# Patient Record
Sex: MALE | Race: WHITE | NOT HISPANIC OR LATINO | Employment: FULL TIME | URBAN - METROPOLITAN AREA
[De-identification: names, ages, dates, MRNs, and addresses within clinical notes are randomized per-mention and may not be internally consistent; named-entity substitution may affect disease eponyms.]

---

## 2017-05-11 ENCOUNTER — LAB VISIT (OUTPATIENT)
Dept: LAB | Facility: HOSPITAL | Age: 47
End: 2017-05-11
Attending: INTERNAL MEDICINE
Payer: COMMERCIAL

## 2017-05-11 ENCOUNTER — OFFICE VISIT (OUTPATIENT)
Dept: INTERNAL MEDICINE | Facility: CLINIC | Age: 47
End: 2017-05-11
Payer: COMMERCIAL

## 2017-05-11 VITALS
SYSTOLIC BLOOD PRESSURE: 122 MMHG | DIASTOLIC BLOOD PRESSURE: 74 MMHG | TEMPERATURE: 98 F | HEART RATE: 58 BPM | BODY MASS INDEX: 26.74 KG/M2 | HEIGHT: 69 IN | WEIGHT: 180.56 LBS

## 2017-05-11 DIAGNOSIS — Z00.00 ROUTINE HISTORY AND PHYSICAL EXAMINATION OF ADULT: ICD-10-CM

## 2017-05-11 DIAGNOSIS — Z72.51 HIGH RISK SEXUAL BEHAVIOR: ICD-10-CM

## 2017-05-11 DIAGNOSIS — R53.83 FATIGUE, UNSPECIFIED TYPE: ICD-10-CM

## 2017-05-11 DIAGNOSIS — Z00.00 ROUTINE HISTORY AND PHYSICAL EXAMINATION OF ADULT: Primary | ICD-10-CM

## 2017-05-11 DIAGNOSIS — F42.9 OBSESSIVE-COMPULSIVE DISORDER, UNSPECIFIED TYPE: ICD-10-CM

## 2017-05-11 PROBLEM — F54 PSYCHOGENIC POLYDIPSIA: Status: ACTIVE | Noted: 2017-05-11

## 2017-05-11 PROBLEM — R63.1 PSYCHOGENIC POLYDIPSIA: Status: ACTIVE | Noted: 2017-05-11

## 2017-05-11 LAB
ALBUMIN SERPL BCP-MCNC: 3.8 G/DL
ALP SERPL-CCNC: 75 U/L
ALT SERPL W/O P-5'-P-CCNC: 25 U/L
ANION GAP SERPL CALC-SCNC: 7 MMOL/L
AST SERPL-CCNC: 33 U/L
BILIRUB SERPL-MCNC: 0.6 MG/DL
BUN SERPL-MCNC: 19 MG/DL
CALCIUM SERPL-MCNC: 9.6 MG/DL
CHLORIDE SERPL-SCNC: 102 MMOL/L
CHOLEST/HDLC SERPL: 3.1 {RATIO}
CO2 SERPL-SCNC: 28 MMOL/L
CREAT SERPL-MCNC: 0.9 MG/DL
EST. GFR  (AFRICAN AMERICAN): >60 ML/MIN/1.73 M^2
EST. GFR  (NON AFRICAN AMERICAN): >60 ML/MIN/1.73 M^2
GLUCOSE SERPL-MCNC: 102 MG/DL
HDL/CHOLESTEROL RATIO: 32.1 %
HDLC SERPL-MCNC: 190 MG/DL
HDLC SERPL-MCNC: 61 MG/DL
HIV 1+2 AB+HIV1 P24 AG SERPL QL IA: NEGATIVE
LDLC SERPL CALC-MCNC: 111 MG/DL
NONHDLC SERPL-MCNC: 129 MG/DL
POTASSIUM SERPL-SCNC: 4.2 MMOL/L
PROT SERPL-MCNC: 7.6 G/DL
SODIUM SERPL-SCNC: 137 MMOL/L
TESTOST SERPL-MCNC: 429 NG/DL
TRIGL SERPL-MCNC: 90 MG/DL

## 2017-05-11 PROCEDURE — 84403 ASSAY OF TOTAL TESTOSTERONE: CPT

## 2017-05-11 PROCEDURE — 36415 COLL VENOUS BLD VENIPUNCTURE: CPT

## 2017-05-11 PROCEDURE — 87591 N.GONORRHOEAE DNA AMP PROB: CPT | Mod: 91

## 2017-05-11 PROCEDURE — 86703 HIV-1/HIV-2 1 RESULT ANTBDY: CPT

## 2017-05-11 PROCEDURE — 99396 PREV VISIT EST AGE 40-64: CPT | Mod: S$GLB,,, | Performed by: INTERNAL MEDICINE

## 2017-05-11 PROCEDURE — 86592 SYPHILIS TEST NON-TREP QUAL: CPT

## 2017-05-11 PROCEDURE — 88112 CYTOPATH CELL ENHANCE TECH: CPT | Mod: 26,,, | Performed by: PATHOLOGY

## 2017-05-11 PROCEDURE — 80053 COMPREHEN METABOLIC PANEL: CPT

## 2017-05-11 PROCEDURE — 87591 N.GONORRHOEAE DNA AMP PROB: CPT

## 2017-05-11 PROCEDURE — 99999 PR PBB SHADOW E&M-EST. PATIENT-LVL IV: CPT | Mod: PBBFAC,,, | Performed by: INTERNAL MEDICINE

## 2017-05-11 PROCEDURE — 83036 HEMOGLOBIN GLYCOSYLATED A1C: CPT

## 2017-05-11 PROCEDURE — 87491 CHLMYD TRACH DNA AMP PROBE: CPT

## 2017-05-11 PROCEDURE — 80061 LIPID PANEL: CPT

## 2017-05-11 PROCEDURE — 88112 CYTOPATH CELL ENHANCE TECH: CPT | Performed by: PATHOLOGY

## 2017-05-11 RX ORDER — CITALOPRAM 10 MG/1
10 TABLET ORAL DAILY
Qty: 30 TABLET | Refills: 11
Start: 2017-05-11 | End: 2017-10-24

## 2017-05-11 NOTE — PROGRESS NOTES
Subjective:       Patient ID: Robby Maya is a 46 y.o. male.    Chief Complaint: Annual Exam    HPI - Mr. Maya is here for f/u.  He's taking his benzo, but didn't think citalopram helped so he stopped it.  He has OCD with anxiety, manifest by stereotyped behaviors, excessive water drinking, anger when interrupted, near constant worry, and insomnia.  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      He is sexually active with other men, with approximately 10 partners in the past six months.  Consistent condom use.  He wants STI screening and would like to consider PrEP.  He's fatigued and would like to have his testosterone levels checked.  Nonsmoker, rare alcohol use.    PMH:  Anxiety  Obsessive-Compulsive behaviors  Psychogenic Polydipsia     Meds: only klonopin now.    Review of Systems   Constitutional: Negative for activity change and unexpected weight change.   HENT: Negative for hearing loss, rhinorrhea and trouble swallowing.    Eyes: Negative for discharge.   Respiratory: Positive for chest tightness (when having anxiety). Negative for wheezing.    Cardiovascular: Positive for palpitations (longstanding). Negative for chest pain.   Gastrointestinal: Negative for blood in stool, constipation, diarrhea and vomiting.   Endocrine: Negative for polydipsia and polyuria.   Genitourinary: Negative for difficulty urinating, hematuria and urgency.   Musculoskeletal: Negative for arthralgias.   Neurological: Negative for weakness and headaches.   Psychiatric/Behavioral: Positive for dysphoric mood. Negative for confusion.       Objective:      Physical Exam   Constitutional:   Well-appearing anxious Prydeinig man in NAD   Nursing note and vitals reviewed.      Assessment:       1. Routine history and physical examination of adult    2. Obsessive-compulsive disorder, unspecified type    3. High risk sexual behavior    4. Fatigue, unspecified type        Plan:       Robby was seen today for  annual exam.    Diagnoses and all orders for this visit:    Routine history and physical examination of adult - healthy, other than OCD/anxiety disorder.  Update labs, as these haven't been done in a while  -     Comprehensive metabolic panel; Future  -     Lipid panel; Future  -     Hemoglobin A1c; Future    Obsessive-compulsive disorder, unspecified type - He has never seen psychology.  Needs to be on an SSRI, so I encouraged that also  -     Ambulatory consult to Psychology   -     citalopram (CELEXA) 10 MG tablet; Take 1 tablet (10 mg total) by mouth once daily.    High risk sexual behavior - at risk for HIV, stable.  When HIV comes back negative, will start on truvada  -     HIV-1 and HIV-2 antibodies; Future  -     RPR; Future  -     C. trachomatis/N. gonorrhoeae by AMP DNA Urine  -     Cancel: Cytology Specimen-Medical Cytology (Fluid/Wash/Brush)  -     Cytology Specimen-Medical Cytology (Fluid/Wash/Brush)  -     Misc. C trach/N gonor Amplified RNA Rectal  -     N.gonorroheae, Miscellaneous site, (AMP RNA) Throat; Future    Fatigue, unspecified type - new complaint.  Per patient request, will look at T levels.  -     Testosterone; Future    rtc 6 months, sooner prn.    G Jomar Velarde MD MPH  Staff Internist

## 2017-05-11 NOTE — MR AVS SNAPSHOT
[facility]  [address]  [place] [address]  Phone: [phone]  Fax: [phone]                  Robby Maya   2017 8:40 AM   Office Visit    Description:  Male : 1970   Provider:  Gabriel Velarde II, MD   Department:  [facility] Internal Medicine           Reason for Visit     Annual Exam           Diagnoses this Visit        Comments    Routine history and physical examination of adult    -  Primary     Obsessive-compulsive disorder, unspecified type         High risk sexual behavior         Fatigue, unspecified type                To Do List           Goals (5 Years of Data)     None      Follow-Up and Disposition     Return in about 6 months (around 2017) for Annual Exam.       These Medications        Disp Refills Start End    citalopram (CELEXA) 10 MG tablet 30 tablet 11 2017    Take 1 tablet (10 mg total) by mouth once daily. - Oral    Pharmacy: [facility] [address] [place] [address] [address] AT [facility] [place] [place] Davon Yanes  #: [phone]         [facility]     [facility] Nurse Care Line -  Assistance  Unless otherwise directed by your provider, please contact [facility], our nurse care line that is available for  assistance.     Registered nurses in the [facility] provide: appointment scheduling, clinical advisement, health education, and other advisory services.  Call: [phone] (toll free)               Medications           Message regarding Medications     Verify the changes and/or additions to your medication regime listed below are the same as discussed with your clinician today.  If any of these changes or additions are incorrect, please notify your healthcare provider.        START taking these NEW medications        Refills    citalopram (CELEXA) 10 MG tablet 11    Sig: Take 1 tablet (10 mg total) by mouth once daily.    Class: No Print    Route: Oral  "          Verify that the below list of medications is an accurate representation of the medications you are currently taking.  If none reported, the list may be blank. If incorrect, please contact your healthcare provider. Carry this list with you in case of emergency.           Current Medications     clonazePAM (KLONOPIN) 1 MG tablet TAKE 1 TABLET(1 MG) BY MOUTH TWICE DAILY AS NEEDED FOR ANXIETY    citalopram (CELEXA) 10 MG tablet Take 1 tablet (10 mg total) by mouth once daily.           Clinical Reference Information           Your Vitals Were     BP Pulse Temp Height Weight BMI    122/74 (BP Location: Right arm, Patient Position: Sitting, BP Method: Manual) 58 97.6 °F (36.4 °C) (Oral) 5' 8.5" (1.74 m) 81.9 kg (180 lb 8.9 oz) 27.05 kg/m2      Blood Pressure          Most Recent Value    BP  122/74      Allergies as of 5/11/2017     No Known Allergies      Immunizations Administered on Date of Encounter - 5/11/2017     None      Orders Placed During Today's Visit      Normal Orders This Visit    Ambulatory consult to Psychology     C. trachomatis/N. gonorrhoeae by AMP DNA Urine     Cytology Specimen-Medical Cytology (Fluid/Wash/Brush)     Misc. C trach/N gonor Amplified RNA Rectal     Future Labs/Procedures Expected by Expires    Comprehensive metabolic panel  5/11/2017 7/10/2018    Hemoglobin A1c  5/11/2017 8/9/2017    HIV-1 and HIV-2 antibodies  5/11/2017 7/10/2018    Lipid panel  5/11/2017 7/10/2018    N.gonorroheae, Miscellaneous site, (AMP RNA) Throat  5/11/2017 7/10/2018    RPR  5/11/2017 7/10/2018    Testosterone  5/11/2017 5/11/2018      Language Assistance Services     ATTENTION: Language assistance services are available, free of charge. Please call [phone].      ATENCIÓN: Si habla español, tiene a chapman disposición servicios gratuitos de asistencia lingüística. Llame al [phone].     CHÚ Ý: N?u b?n nói Ti?ng Vi?t, có các d?ch v? h? tr? ngôn ng? mi?n phí dành cho b?n. G?i s? [phone].   "       [facility] complies with applicable Federal civil rights laws and does not discriminate on the basis of race, color, national origin, age, disability, or sex.

## 2017-05-12 ENCOUNTER — PATIENT MESSAGE (OUTPATIENT)
Dept: INTERNAL MEDICINE | Facility: CLINIC | Age: 47
End: 2017-05-12

## 2017-05-12 LAB
C TRACH DNA SPEC QL NAA+PROBE: NOT DETECTED
C.TRACH RNA SOURCE: NORMAL
C.TRACH,MISC. AMP RNA: NEGATIVE
ESTIMATED AVG GLUCOSE: 120 MG/DL
HBA1C MFR BLD HPLC: 5.8 %
N GONORRHOEA DNA SPEC QL NAA+PROBE: NOT DETECTED
N.GONORROHEAE, AMP RNA SOURCE: NORMAL
N.GONORROHEAE, AMP RNA SOURCE: NORMAL
N.GONORROHEAE, MISC. AMP RNA: NEGATIVE
N.GONORROHEAE, MISC. AMP RNA: NEGATIVE
RPR SER QL: NORMAL

## 2017-07-12 RX ORDER — CLONAZEPAM 1 MG/1
TABLET ORAL
Qty: 90 TABLET | Refills: 5 | Status: SHIPPED | OUTPATIENT
Start: 2017-07-12 | End: 2018-02-22 | Stop reason: SDUPTHER

## 2017-08-11 ENCOUNTER — NURSE TRIAGE (OUTPATIENT)
Dept: ADMINISTRATIVE | Facility: CLINIC | Age: 47
End: 2017-08-11

## 2017-08-11 NOTE — TELEPHONE ENCOUNTER
"    Reason for Disposition   Heart beating irregularly or very rapidly    Protocols used: ST CHEST PAIN-A-OH    Pt c/o left chest "comprasion or sensation" not pain. Pt states he feels like his heart is not beating regularly. Care advice given. Pt states he does not want to go to the ER. Advised message would be sent to PCP office for further care advise.   "

## 2017-08-11 NOTE — TELEPHONE ENCOUNTER
Spoke to patient at length.  He has debilitating anxiety that has caused multiple problems in the past, including psychogenic polydipsia and several ER visits for atypical chest pain.  He has again stopped his celexa and is again having atypical chest pain.    After our call today, he agreed to go back on the celexa

## 2017-10-24 DIAGNOSIS — R46.81 OBSESSIVE BEHAVIOR: ICD-10-CM

## 2017-10-24 DIAGNOSIS — F41.9 ANXIETY: ICD-10-CM

## 2017-10-24 RX ORDER — CITALOPRAM 10 MG/1
TABLET ORAL
Qty: 90 TABLET | Refills: 3 | Status: SHIPPED | OUTPATIENT
Start: 2017-10-24 | End: 2018-05-31

## 2017-11-14 ENCOUNTER — TELEPHONE (OUTPATIENT)
Dept: INTERNAL MEDICINE | Facility: CLINIC | Age: 47
End: 2017-11-14

## 2017-11-14 ENCOUNTER — PATIENT MESSAGE (OUTPATIENT)
Dept: INTERNAL MEDICINE | Facility: CLINIC | Age: 47
End: 2017-11-14

## 2017-11-14 NOTE — TELEPHONE ENCOUNTER
----- Message from Melodie Brock sent at 11/14/2017  2:54 PM CST -----  Contact: self/[phone]      Patient is returning a phone call.  Who left a message for the patient: Ida  Does patient know what this is regarding:  appointment  Comments: Patient stated that he is available after lunch for his appointment.thanks

## 2018-02-23 RX ORDER — CLONAZEPAM 1 MG/1
TABLET ORAL
Qty: 90 TABLET | Refills: 0 | Status: SHIPPED | OUTPATIENT
Start: 2018-02-23 | End: 2018-05-31 | Stop reason: SDUPTHER

## 2018-02-23 RX ORDER — CLONAZEPAM 1 MG/1
TABLET ORAL
Qty: 90 TABLET | Refills: 0 | OUTPATIENT
Start: 2018-02-23

## 2018-02-23 NOTE — TELEPHONE ENCOUNTER
----- Message from Corby Vargas sent at 2/23/2018  1:56 PM CST -----  Contact: Patient @ [phone]  Patient needs a refill on (clonazePAM (KLONOPIN) 1 MG tablet )     [id number] - [place], [place] - [address] AT [facility] [address]  [address]  [facility] [address]  Phone: [phone] Fax: [phone]

## 2018-05-17 ENCOUNTER — TELEPHONE (OUTPATIENT)
Dept: INTERNAL MEDICINE | Facility: CLINIC | Age: 48
End: 2018-05-17

## 2018-05-17 NOTE — TELEPHONE ENCOUNTER
----- Message from Suzi Adames sent at 5/17/2018  9:44 AM CDT -----  Contact: self  Doctor appointment and lab have been scheduled.  Please link lab orders to the lab appointment.  Date of doctor appointment:  05/31  Physical or EP:  physical  Date of lab appointment:  05/28  Comments:

## 2018-05-31 ENCOUNTER — OFFICE VISIT (OUTPATIENT)
Dept: INTERNAL MEDICINE | Facility: CLINIC | Age: 48
End: 2018-05-31
Payer: COMMERCIAL

## 2018-05-31 VITALS
DIASTOLIC BLOOD PRESSURE: 76 MMHG | BODY MASS INDEX: 25.73 KG/M2 | WEIGHT: 169.75 LBS | HEIGHT: 68 IN | SYSTOLIC BLOOD PRESSURE: 122 MMHG | HEART RATE: 59 BPM

## 2018-05-31 DIAGNOSIS — Z11.3 ROUTINE SCREENING FOR STI (SEXUALLY TRANSMITTED INFECTION): ICD-10-CM

## 2018-05-31 DIAGNOSIS — F42.9 OBSESSIVE-COMPULSIVE DISORDER, UNSPECIFIED TYPE: Primary | ICD-10-CM

## 2018-05-31 DIAGNOSIS — F54 PSYCHOGENIC POLYDIPSIA: ICD-10-CM

## 2018-05-31 DIAGNOSIS — F41.9 ANXIETY: ICD-10-CM

## 2018-05-31 DIAGNOSIS — R63.1 PSYCHOGENIC POLYDIPSIA: ICD-10-CM

## 2018-05-31 DIAGNOSIS — Z71.84 TRAVEL ADVICE ENCOUNTER: ICD-10-CM

## 2018-05-31 LAB
BILIRUB UR QL STRIP: NEGATIVE
C TRACH DNA SPEC QL NAA+PROBE: NOT DETECTED
CLARITY UR REFRACT.AUTO: CLEAR
COLOR UR AUTO: YELLOW
GLUCOSE UR QL STRIP: NEGATIVE
HGB UR QL STRIP: NEGATIVE
KETONES UR QL STRIP: NEGATIVE
LEUKOCYTE ESTERASE UR QL STRIP: NEGATIVE
MICROSCOPIC COMMENT: NORMAL
N GONORRHOEA DNA SPEC QL NAA+PROBE: NOT DETECTED
NITRITE UR QL STRIP: NEGATIVE
PH UR STRIP: 6 [PH] (ref 5–8)
PROT UR QL STRIP: NEGATIVE
RBC #/AREA URNS AUTO: 0 /HPF (ref 0–4)
SP GR UR STRIP: 1 (ref 1–1.03)
URN SPEC COLLECT METH UR: NORMAL
UROBILINOGEN UR STRIP-ACNC: NEGATIVE EU/DL
WBC #/AREA URNS AUTO: 0 /HPF (ref 0–5)

## 2018-05-31 PROCEDURE — 3008F BODY MASS INDEX DOCD: CPT | Mod: CPTII,S$GLB,, | Performed by: INTERNAL MEDICINE

## 2018-05-31 PROCEDURE — 99396 PREV VISIT EST AGE 40-64: CPT | Mod: S$GLB,,, | Performed by: INTERNAL MEDICINE

## 2018-05-31 PROCEDURE — 99999 PR PBB SHADOW E&M-EST. PATIENT-LVL III: CPT | Mod: PBBFAC,,, | Performed by: INTERNAL MEDICINE

## 2018-05-31 PROCEDURE — 87491 CHLMYD TRACH DNA AMP PROBE: CPT

## 2018-05-31 PROCEDURE — 81001 URINALYSIS AUTO W/SCOPE: CPT

## 2018-05-31 RX ORDER — DULOXETIN HYDROCHLORIDE 30 MG/1
30 CAPSULE, DELAYED RELEASE ORAL DAILY
Qty: 90 CAPSULE | Refills: 3 | Status: SHIPPED | OUTPATIENT
Start: 2018-05-31 | End: 2018-12-05

## 2018-05-31 RX ORDER — CLONAZEPAM 1 MG/1
1 TABLET ORAL 2 TIMES DAILY
Qty: 90 TABLET | Refills: 0 | Status: SHIPPED | OUTPATIENT
Start: 2018-05-31 | End: 2018-11-07 | Stop reason: SDUPTHER

## 2018-05-31 RX ORDER — ZOLPIDEM TARTRATE 5 MG/1
5 TABLET ORAL NIGHTLY PRN
Qty: 5 TABLET | Refills: 0 | Status: SHIPPED | OUTPATIENT
Start: 2018-05-31 | End: 2018-12-27

## 2018-05-31 NOTE — PROGRESS NOTES
Subjective:       Patient ID: Robby Maya is a 47 y.o. male.    Chief Complaint: Annual Exam    HPI - Robby is in a good mood today.  He did not like taking citalopram, so is only on klonopin.  OCD behaviors continue unabated, and he recognizes these as such.  Not drinking water like he used to; worried about recurrence of hyponatremia.  He says he's not very sexually active (two male partners in past six months), but requests sti screening.  He'll think about truvada for hiv prevention, but not now.  He's traveling to the [facility] soon and requested ambien to take with him to help with the jetlag.    PMH:  Anxiety  Obsessive-Compulsive behaviors  Psychogenic Polydipsia     Meds: only klonopin.    Review of Systems   Constitutional: Negative for activity change and unexpected weight change.   HENT: Negative for hearing loss, rhinorrhea and trouble swallowing.    Eyes: Negative for discharge and visual disturbance.   Respiratory: Negative for wheezing.    Cardiovascular: Positive for palpitations. Negative for chest pain.   Gastrointestinal: Negative for blood in stool, constipation, diarrhea and vomiting.   Endocrine: Negative for polydipsia and polyuria.   Genitourinary: Negative for difficulty urinating, hematuria and urgency.   Musculoskeletal: Negative for arthralgias, joint swelling and neck pain.   Skin: Negative for rash.   Neurological: Negative for weakness and headaches.   Psychiatric/Behavioral: Negative for confusion and dysphoric mood.       Objective:      Physical Exam   Constitutional: He is oriented to person, place, and time. He appears well-developed and well-nourished. No distress.   Well-appearing lean, muscular man who looks younger than age 47.   HENT:   Head: Normocephalic and atraumatic.   Cardiovascular: Normal rate, regular rhythm and normal heart sounds.  Exam reveals no gallop and no friction rub.    No murmur heard.  Pulmonary/Chest: No respiratory distress. He has no  wheezes. He has no rales. He exhibits no tenderness.   Neurological: He is alert and oriented to person, place, and time.   Skin: Skin is warm. He is not diaphoretic. No erythema.   Psychiatric:   Anxious affect.   Nursing note and vitals reviewed.      Assessment:       1. Obsessive-compulsive disorder, unspecified type    2. Anxiety    3. Psychogenic polydipsia    4. Routine screening for STI (sexually transmitted infection)    5. Travel advice encounter        Plan:       Robby was seen today for annual exam.    Diagnoses and all orders for this visit:    Obsessive-compulsive disorder, unspecified type - spent most of the visit discussing this.  Uncontrolled, untreated.  Encouraged adherence to medication regimen.  Start cymbalta  -     DULoxetine (CYMBALTA) 30 MG capsule; Take 1 capsule (30 mg total) by mouth once daily.    Anxiety  -     DULoxetine (CYMBALTA) 30 MG capsule; Take 1 capsule (30 mg total) by mouth once daily.    Psychogenic polydipsia  -     DULoxetine (CYMBALTA) 30 MG capsule; Take 1 capsule (30 mg total) by mouth once daily.  -     Comprehensive metabolic panel; Future  -     Urinalysis    Routine screening for STI (sexually transmitted infection) - per recommendations for MSM.  He had a high copay for rectal and oral gc/chlamydia  -     HIV-1 and HIV-2 antibodies; Future  -     RPR; Future  -     C. trachomatis/N. gonorrhoeae by AMP DNA Urine    Travel advice encounter - new complaint.  Recommended ambien; will give 5 days worth.  Do NOT anticipate refilling this.  -     zolpidem (AMBIEN) 5 MG Tab; Take 1 tablet (5 mg total) by mouth nightly as needed.    rtc 6 months to check on ocd progression.      JIHAN Velarde MD MPH  Staff Internist

## 2018-05-31 NOTE — TELEPHONE ENCOUNTER
----- Message from Graham Salinas sent at 5/31/2018  9:40 AM CDT -----  Rx Refill/Request     Is this a Refill or New Rx:  Refill  Rx Name and Strength:  clonazePAM (KLONOPIN) 1 MG tablet  Preferred Pharmacy with phone number: See below  Communication Preference: [phone]  Additional Information:     [facility] [address] [place], [place] [address] [address] AT [address]  [place] [address]  Phone: [phone] Fax: [phone]

## 2018-07-18 ENCOUNTER — TELEPHONE (OUTPATIENT)
Dept: INTERNAL MEDICINE | Facility: CLINIC | Age: 48
End: 2018-07-18

## 2018-07-18 NOTE — TELEPHONE ENCOUNTER
----- Message from Tasha Solis sent at 7/17/2018  3:33 PM CDT -----  Contact: Call  [phone].   Patient needed to verify that you coded his appointment on 5/31/2018 as an  annual physical? His insurance told him it was not coded as such and is charging him $500.00. I referred the patient to billing but he said he has spoken with billing stated it was not showing an annual exam.  Please call him.

## 2018-09-11 ENCOUNTER — OFFICE VISIT (OUTPATIENT)
Dept: URGENT CARE | Facility: CLINIC | Age: 48
End: 2018-09-11
Payer: COMMERCIAL

## 2018-09-11 VITALS
HEIGHT: 68 IN | OXYGEN SATURATION: 100 % | SYSTOLIC BLOOD PRESSURE: 121 MMHG | TEMPERATURE: 99 F | DIASTOLIC BLOOD PRESSURE: 79 MMHG | BODY MASS INDEX: 25.76 KG/M2 | WEIGHT: 170 LBS | HEART RATE: 61 BPM

## 2018-09-11 DIAGNOSIS — H92.03 OTALGIA OF BOTH EARS: ICD-10-CM

## 2018-09-11 DIAGNOSIS — Z20.2 POSSIBLE EXPOSURE TO STD: Primary | ICD-10-CM

## 2018-09-11 DIAGNOSIS — J01.40 ACUTE PANSINUSITIS, RECURRENCE NOT SPECIFIED: ICD-10-CM

## 2018-09-11 PROCEDURE — 99214 OFFICE O/P EST MOD 30 MIN: CPT | Mod: S$GLB,,, | Performed by: NURSE PRACTITIONER

## 2018-09-11 PROCEDURE — 3008F BODY MASS INDEX DOCD: CPT | Mod: CPTII,S$GLB,, | Performed by: NURSE PRACTITIONER

## 2018-09-11 RX ORDER — CEFDINIR 300 MG/1
300 CAPSULE ORAL 2 TIMES DAILY
Qty: 14 CAPSULE | Refills: 0 | Status: SHIPPED | OUTPATIENT
Start: 2018-09-11 | End: 2018-09-18

## 2018-09-11 RX ORDER — CEFDINIR 300 MG/1
300 CAPSULE ORAL 2 TIMES DAILY
Qty: 14 CAPSULE | Refills: 0 | Status: SHIPPED | OUTPATIENT
Start: 2018-09-11 | End: 2018-09-11

## 2018-09-11 RX ORDER — BETAMETHASONE SODIUM PHOSPHATE AND BETAMETHASONE ACETATE 3; 3 MG/ML; MG/ML
9 INJECTION, SUSPENSION INTRA-ARTICULAR; INTRALESIONAL; INTRAMUSCULAR; SOFT TISSUE
Status: DISCONTINUED | OUTPATIENT
Start: 2018-09-11 | End: 2019-06-06

## 2018-09-11 NOTE — PROGRESS NOTES
"Subjective:       Patient ID: Robby Maya is a 48 y.o. male.    Vitals:  height is 5' 8" (1.727 m) and weight is 77.1 kg (170 lb). His temperature is 98.9 °F (37.2 °C). His blood pressure is 121/79 and his pulse is 61. His oxygen saturation is 100%.     Chief Complaint: Sinusitis and Exposure to STD    Patient sexually active with other men. He reports anal penetration with a new partner without protection. He is unaware of the partners STD status and very concerned of HIV infection. He denies history of STDs. Denies penile discharge, tenderness, dysuria.      Sinusitis   This is a new problem. Episode onset: 11 days with sinusitis. The problem has been gradually worsening since onset. Associated symptoms include congestion, ear pain, headaches, sinus pressure and a sore throat. Pertinent negatives include no chills. Past treatments include nothing. The treatment provided no relief.   Exposure to STD   This is a new problem. Episode onset: un protected sex 1 month ago. Associated symptoms include headaches and a sore throat. Pertinent negatives include no chills, dysuria, fever, nausea, rash, urgency or vomiting.     Review of Systems   Constitution: Negative for chills and fever.   HENT: Positive for congestion, ear pain, sinus pressure and sore throat.    Eyes: Negative for discharge.   Skin: Negative for flushing and rash.   Musculoskeletal: Negative for back pain.   Gastrointestinal: Negative for nausea and vomiting.   Genitourinary: Negative for dysuria, genital sores, hematuria and urgency.        No symptoms with urination.   Neurological: Positive for headaches.       Objective:      Physical Exam   Constitutional: He is oriented to person, place, and time. He appears well-developed and well-nourished. He is cooperative.  Non-toxic appearance. He does not appear ill. No distress.   HENT:   Head: Normocephalic and atraumatic.   Right Ear: Hearing, tympanic membrane, external ear and ear canal normal. "   Left Ear: Hearing, tympanic membrane, external ear and ear canal normal.   Nose: Mucosal edema and rhinorrhea present. No nasal deformity. No epistaxis. Right sinus exhibits maxillary sinus tenderness and frontal sinus tenderness. Left sinus exhibits maxillary sinus tenderness and frontal sinus tenderness.   Mouth/Throat: Uvula is midline and mucous membranes are normal. No trismus in the jaw. Normal dentition. No uvula swelling. Posterior oropharyngeal edema and posterior oropharyngeal erythema present.   Eyes: Conjunctivae and lids are normal. No scleral icterus.   Sclera clear bilat   Neck: Trachea normal, full passive range of motion without pain and phonation normal. Neck supple.   Cardiovascular: Normal rate, regular rhythm, normal heart sounds, intact distal pulses and normal pulses.   Pulmonary/Chest: Effort normal and breath sounds normal. No stridor. No respiratory distress. He has no decreased breath sounds. He has no wheezes. He has no rhonchi.   Abdominal: Soft. Normal appearance and bowel sounds are normal. He exhibits no distension. There is no tenderness.   Musculoskeletal: Normal range of motion. He exhibits no edema or deformity.   Neurological: He is alert and oriented to person, place, and time. He exhibits normal muscle tone. Coordination normal.   Skin: Skin is warm, dry and intact. He is not diaphoretic. No pallor.   Psychiatric: He has a normal mood and affect. His speech is normal and behavior is normal. Judgment and thought content normal. Cognition and memory are normal.   Nursing note and vitals reviewed.      Assessment:       1. Possible exposure to STD    2. Acute pansinusitis, recurrence not specified    3. Otalgia of both ears        Plan:     Long discussion of recommended STD testing for possible STD exposure. Patient spoke to insurance company on phone and decided to have more extensive STD testing than just HIV. Encouraged patient to follow up with PCP regarding  Prep.    Possible exposure to STD  -     HIV-1 and HIV-2 antibodies  -     RPR  -     Herpes simplex type 1&2 IgG,Herpes titer  -     C. trachomatis/N. gonorrhoeae by AMP DNA    Acute pansinusitis, recurrence not specified  -     betamethasone acetate-betamethasone sodium phosphate injection 9 mg; Inject 1.5 mLs (9 mg total) into the muscle one time.  -     cefdinir (OMNICEF) 300 MG capsule; Take 1 capsule (300 mg total) by mouth 2 (two) times daily. for 7 days  Dispense: 14 capsule; Refill: 0    Otalgia of both ears      Patient Instructions   Follow up with your doctor in a few days.  Return to the urgent care or go to the ER if symptoms get worse.    WE WILL CALL YOU WITH THE RESULTS OF YOUR BLOOD TEST IN 5-7 DAYS.  FOLLOW UP WITH YOUR PRIMARY CARE REGARDING PREP. SEE HANDOUT ON PREP.       You received a steroid shot today - this can elevate your blood pressure, elevate your blood sugar, water weight gain, nervous energy, redness to the face and dimpling of the skin where the shot goes in.       Start taking your antibiotics and take for the full duration.     Zyrtec, Claritin, or Allegra OTC as directed for the next 7 days  Add a decongestant to your antihistamine for congestion- like Zyrtec-D, Claritin D, Allegra D-this may increase your blood pressure.   If high blood pressure, an alternative decongestant is Coricidin HBP   Flonase OTC as directed for the next 7 days  Salt Water Nasal Spray OTC 3x/day for the next 7 days        Follow up with Primary Care or ENT if not improved in 7-10 Days        Sinusitis (Antibiotic Treatment)    The sinuses are air-filled spaces within the bones of the face. They connect to the inside of the nose. Sinusitis is an inflammation of the tissue lining the sinus cavity. Sinus inflammation can occur during a cold. It can also be due to allergies to pollens and other particles in the air. Sinusitis can cause symptoms of sinus congestion and fullness. A sinus infection causes  "fever, headache and facial pain. There is often green or yellow drainage from the nose or into the back of the throat (post-nasal drip). You have been given antibiotics to treat this condition.    Please return here or go to the Emergency Department for any concerns or worsening of condition.    Home care:  · Take the full course of antibiotics as instructed. Do not stop taking them, even if you feel better.  · Get rest!  · Drink plenty of water, hot tea, and other liquids. This may help thin mucus. It also may promote sinus drainage.  · Heat may help soothe painful areas of the face. Use a towel soaked in hot water. Or,  the shower and direct the hot spray onto your face. Using a vaporizer along with a menthol rub at night may also help.   · An expectorant containing guaifenesin may help thin the mucus and promote drainage from the sinuses.  · If you are a female and on birth control pills and take the antibiotics, use additional methods to prevent pregnancy while on the antibiotics and for one cycle after.  · Flonase (fluticasone) is an over the counter nasal spray which may help with your symptoms.  · Zyrtec D, Claritin D, or Allegra D can also help with symptoms of congestion and drainage  · If you have hypertesion, avoid using the "D" which is a decongestant.  · If clear drainage, you can take plain zyrtec, claritin, allegra.  · If congested, you can take pseudoephedrine-you need to ask for this behind the pharmacy counter-do not take if you have high blood pressure. Pheylephrine is on the shelf and is not effective.  · Tylenol or ibuprofen can be used as directed for pain, unless you have allergies. Avoid ibuprofen if medical conditions such as stomach ulcers, kidney or liver disease, or blood thinners  · Afrin is effective if flying in the next few days. Take as directed for the airplane flight upon taking off and landing. Do not continue to use Afrin as it will cause rebound congestion.  · Don't " smoke. This can worsen symptoms.  Follow-up care  Follow up with your healthcare provider or our staff if you are not improving within the next week.    When to seek medical advice  Call your healthcare provider if any of these occur:  · Facial pain or headache becoming more severe  · Stiff neck  · Unusual drowsiness or confusion  · Swelling of the forehead or eyelids  · Vision problems, including blurred or double vision  · Fever of 100.4ºF (38ºC) or higher, or as directed by your healthcare provider  · Seizure  · Breathing problems  · Symptoms not resolving within 10 days

## 2018-09-11 NOTE — PATIENT INSTRUCTIONS
Follow up with your doctor in a few days.  Return to the urgent care or go to the ER if symptoms get worse.    WE WILL CALL YOU WITH THE RESULTS OF YOUR BLOOD TEST IN 5-7 DAYS.  FOLLOW UP WITH YOUR PRIMARY CARE REGARDING PREP. SEE HANDOUT ON PREP.       You received a steroid shot today - this can elevate your blood pressure, elevate your blood sugar, water weight gain, nervous energy, redness to the face and dimpling of the skin where the shot goes in.       Start taking your antibiotics and take for the full duration.     Zyrtec, Claritin, or Allegra OTC as directed for the next 7 days  Add a decongestant to your antihistamine for congestion- like Zyrtec-D, Claritin D, Allegra D-this may increase your blood pressure.   If high blood pressure, an alternative decongestant is Coricidin HBP   Flonase OTC as directed for the next 7 days  Salt Water Nasal Spray OTC 3x/day for the next 7 days        Follow up with Primary Care or ENT if not improved in 7-10 Days        Sinusitis (Antibiotic Treatment)    The sinuses are air-filled spaces within the bones of the face. They connect to the inside of the nose. Sinusitis is an inflammation of the tissue lining the sinus cavity. Sinus inflammation can occur during a cold. It can also be due to allergies to pollens and other particles in the air. Sinusitis can cause symptoms of sinus congestion and fullness. A sinus infection causes fever, headache and facial pain. There is often green or yellow drainage from the nose or into the back of the throat (post-nasal drip). You have been given antibiotics to treat this condition.    Please return here or go to the Emergency Department for any concerns or worsening of condition.    Home care:  · Take the full course of antibiotics as instructed. Do not stop taking them, even if you feel better.  · Get rest!  · Drink plenty of water, hot tea, and other liquids. This may help thin mucus. It also may promote sinus drainage.  · Heat may  "help soothe painful areas of the face. Use a towel soaked in hot water. Or,  the shower and direct the hot spray onto your face. Using a vaporizer along with a menthol rub at night may also help.   · An expectorant containing guaifenesin may help thin the mucus and promote drainage from the sinuses.  · If you are a female and on birth control pills and take the antibiotics, use additional methods to prevent pregnancy while on the antibiotics and for one cycle after.  · Flonase (fluticasone) is an over the counter nasal spray which may help with your symptoms.  · Zyrtec D, Claritin D, or Allegra D can also help with symptoms of congestion and drainage  · If you have hypertesion, avoid using the "D" which is a decongestant.  · If clear drainage, you can take plain zyrtec, claritin, allegra.  · If congested, you can take pseudoephedrine-you need to ask for this behind the pharmacy counter-do not take if you have high blood pressure. Pheylephrine is on the shelf and is not effective.  · Tylenol or ibuprofen can be used as directed for pain, unless you have allergies. Avoid ibuprofen if medical conditions such as stomach ulcers, kidney or liver disease, or blood thinners  · Afrin is effective if flying in the next few days. Take as directed for the airplane flight upon taking off and landing. Do not continue to use Afrin as it will cause rebound congestion.  · Don't smoke. This can worsen symptoms.  Follow-up care  Follow up with your healthcare provider or our staff if you are not improving within the next week.    When to seek medical advice  Call your healthcare provider if any of these occur:  · Facial pain or headache becoming more severe  · Stiff neck  · Unusual drowsiness or confusion  · Swelling of the forehead or eyelids  · Vision problems, including blurred or double vision  · Fever of 100.4ºF (38ºC) or higher, or as directed by your healthcare provider  · Seizure  · Breathing problems  · Symptoms not " resolving within 10 days

## 2018-09-12 LAB
C TRACH RRNA SPEC QL NAA+PROBE: NEGATIVE
HIV 1+2 AB+HIV1 P24 AG SERPL QL IA: NON REACTIVE
N GONORRHOEA RRNA SPEC QL NAA+PROBE: NEGATIVE
RPR SER QL: NON REACTIVE

## 2018-09-13 ENCOUNTER — TELEPHONE (OUTPATIENT)
Dept: URGENT CARE | Facility: CLINIC | Age: 48
End: 2018-09-13

## 2018-09-13 NOTE — TELEPHONE ENCOUNTER
Left a voicemail for the Pt to call back.  ----- Message from Benitez Melendez MD sent at 9/12/2018  8:12 PM CDT -----  Call patient and let know all tests negative

## 2018-09-13 NOTE — TELEPHONE ENCOUNTER
Pt returned the call and was notified of his negative results. Pt had questions regarding Prep, and said he would see his PCP for further treatment.  ----- Message from Benitez Melendez MD sent at 9/12/2018  8:12 PM CDT -----  Call patient and let know all tests negative

## 2018-09-18 ENCOUNTER — TELEPHONE (OUTPATIENT)
Dept: INTERNAL MEDICINE | Facility: CLINIC | Age: 48
End: 2018-09-18

## 2018-09-18 NOTE — TELEPHONE ENCOUNTER
----- Message from Tomy Mallory sent at 9/18/2018 10:48 AM CDT -----  Contact: self [phone]  Patient is calling to start medication  Prep , stated he had test done everything was negative  . Please advise Thanks

## 2018-09-19 ENCOUNTER — PATIENT MESSAGE (OUTPATIENT)
Dept: INTERNAL MEDICINE | Facility: CLINIC | Age: 48
End: 2018-09-19

## 2018-09-19 RX ORDER — EMTRICITABINE AND TENOFOVIR DISOPROXIL FUMARATE 200; 300 MG/1; MG/1
1 TABLET, FILM COATED ORAL DAILY
Qty: 90 TABLET | Refills: 0 | Status: SHIPPED | OUTPATIENT
Start: 2018-09-19 | End: 2019-03-21

## 2018-09-20 ENCOUNTER — TELEPHONE (OUTPATIENT)
Dept: PHARMACY | Facility: CLINIC | Age: 48
End: 2018-09-20

## 2018-09-20 ENCOUNTER — PATIENT MESSAGE (OUTPATIENT)
Dept: INTERNAL MEDICINE | Facility: CLINIC | Age: 48
End: 2018-09-20

## 2018-09-20 NOTE — TELEPHONE ENCOUNTER
Patient returned call to [facility].  Patient to come in to  Truvada in person on 9/20 or 9/21.    LEYDI Lucas.Ph.  Clinical Pharmacist  [facility]  Phone: [phone]

## 2018-09-20 NOTE — TELEPHONE ENCOUNTER
Patient called for initial consult and ship on Truvada as PrEP - na lvm    LEYDI Lucas.Ph.  Clinical Pharmacist  [facility]  Phone: [phone]

## 2018-09-21 NOTE — TELEPHONE ENCOUNTER
Patient plans to start Truvada on .  Truvada counseling complete. Name/ confirmed. Consult included: indication; goals of treatment; administration; storage and handling; side effects; how to handle missed doses; the importance of compliance; the importance of maintaining lab appts and follow up appts w\ MD; safe sex practices with condoms. Patient understands that medication alone is not primary HIV prophylaxis. Patient understands this medication will only serve as protection for HIV and no other STDs. Patient understands to contact [facility] and MD for any medication changes due to drug interactions. He understands the importance of 3month regular testing. He understands the importance of ONCE daily dosing due to the potential renal effects of tenofovir. All questions answered and addressed to the patients satisfaction.       Discussed the importance of staying well hydrated while on therapy. Compliance stressed - patient to take missed doses as soon as remembered, but NOT to take 2 doses in one day. Patient will report questions or concerns to myself or practitioner. Patient verbalizes understanding. I will personally f/u with patient in 2 weeks from start, and [facility] will contact patient in 3 weeks to coordinate next refill.        Patient plans to start Truvada on . (please schedule labs accordingly)  Approximately 25 mins spent with the patient on medication education.      Thank you for allowing us to participate in the care of your patient

## 2018-09-28 ENCOUNTER — TELEPHONE (OUTPATIENT)
Dept: PHARMACY | Facility: CLINIC | Age: 48
End: 2018-09-28

## 2018-09-28 NOTE — TELEPHONE ENCOUNTER
Patient called for follow up on Truvada as PrEP.  No answer - lvm for call back.     Ravi Nogueira, LEYDI.Ph.  Clinical Pharmacist  [facility]  Phone: [phone]

## 2018-09-28 NOTE — TELEPHONE ENCOUNTER
Initial clinical follow-up conducted for Truvada as PrEP. Name/ confirmed. no missed doses; no new medications; no side effects noted. Patient understands to report any medication changes to [facility] and provider. All questions answered and addressed to patients satisfaction.      LEYDI Lucas.Ph.  Clinical Pharmacist  [facility]  Phone: [phone]

## 2018-10-10 ENCOUNTER — TELEPHONE (OUTPATIENT)
Dept: PHARMACY | Facility: CLINIC | Age: 48
End: 2018-10-10

## 2018-10-19 NOTE — TELEPHONE ENCOUNTER
Attempting to reach patient for refill readiness and f/u on Truvada.    LEYDI Lucas.Ph.  Clinical Pharmacist  [facility]  Phone: [phone]

## 2018-10-22 ENCOUNTER — PATIENT MESSAGE (OUTPATIENT)
Dept: ADMINISTRATIVE | Facility: OTHER | Age: 48
End: 2018-10-22

## 2018-10-22 NOTE — TELEPHONE ENCOUNTER
Refill readiness for Truvada confirmed with patient; name/ confirmed; occasional missed dose - none consecutive; no new medications; no side effects noted; address confirmed for 10/23 shipment and 10/24 delivery    LEYDI Lucas.Ph.  Clinical Pharmacist  [facility]  Phone: [phone]

## 2018-11-07 NOTE — TELEPHONE ENCOUNTER
----- Message from Graham Salinas sent at 11/7/2018  9:37 AM CST -----  Rx Refill/Request     Is this a Refill or New Rx: Refill  Rx Name and Strength:  clonazePAM (KLONOPIN) 1 MG tablet  Preferred Pharmacy with phone number: see below  Communication Preference: [phone]  Additional Information:     [facility] [address] [place], [place] [address] [address] [address]  Phone: [phone] Fax: [phone]

## 2018-11-08 RX ORDER — CLONAZEPAM 1 MG/1
1 TABLET ORAL 2 TIMES DAILY PRN
Qty: 60 TABLET | Refills: 0 | Status: SHIPPED | OUTPATIENT
Start: 2018-11-08 | End: 2019-03-21

## 2018-11-14 ENCOUNTER — TELEPHONE (OUTPATIENT)
Dept: PHARMACY | Facility: CLINIC | Age: 48
End: 2018-11-14

## 2018-11-15 ENCOUNTER — PATIENT MESSAGE (OUTPATIENT)
Dept: ADMINISTRATIVE | Facility: OTHER | Age: 48
End: 2018-11-15

## 2018-12-03 ENCOUNTER — TELEPHONE (OUTPATIENT)
Dept: NEUROLOGY | Facility: CLINIC | Age: 48
End: 2018-12-03

## 2018-12-03 ENCOUNTER — PATIENT MESSAGE (OUTPATIENT)
Dept: NEUROLOGY | Facility: CLINIC | Age: 48
End: 2018-12-03

## 2018-12-03 NOTE — TELEPHONE ENCOUNTER
----- Message from Corby Vargas sent at 12/3/2018 10:18 AM CST -----  Contact: Patient @ [phone]  Patient calling to schedule a f/u appt, edmundo call

## 2018-12-03 NOTE — TELEPHONE ENCOUNTER
----- Message from Corby Vargas sent at 12/3/2018  1:57 PM CST -----  Contact: Patient @ [phone]  Patient requesting a return call regarding the 1-29th appt, edmundo call

## 2018-12-03 NOTE — TELEPHONE ENCOUNTER
Returned call to pt and he states he will e-mail about how it is doing and he will keep the appointment which schedule. Confirmed time and date with patient.

## 2018-12-05 ENCOUNTER — PATIENT MESSAGE (OUTPATIENT)
Dept: NEUROLOGY | Facility: CLINIC | Age: 48
End: 2018-12-05

## 2018-12-05 ENCOUNTER — OFFICE VISIT (OUTPATIENT)
Dept: NEUROLOGY | Facility: CLINIC | Age: 48
End: 2018-12-05
Payer: COMMERCIAL

## 2018-12-05 VITALS
WEIGHT: 178.38 LBS | HEIGHT: 68 IN | SYSTOLIC BLOOD PRESSURE: 129 MMHG | HEART RATE: 69 BPM | BODY MASS INDEX: 27.03 KG/M2 | DIASTOLIC BLOOD PRESSURE: 70 MMHG

## 2018-12-05 DIAGNOSIS — R46.81 OBSESSIVE-COMPULSIVE BEHAVIOR: ICD-10-CM

## 2018-12-05 DIAGNOSIS — F41.9 ANXIETY: Primary | ICD-10-CM

## 2018-12-05 PROCEDURE — 99214 OFFICE O/P EST MOD 30 MIN: CPT | Mod: S$GLB,,, | Performed by: PSYCHIATRY & NEUROLOGY

## 2018-12-05 PROCEDURE — 3008F BODY MASS INDEX DOCD: CPT | Mod: CPTII,S$GLB,, | Performed by: PSYCHIATRY & NEUROLOGY

## 2018-12-05 PROCEDURE — 99999 PR PBB SHADOW E&M-EST. PATIENT-LVL III: CPT | Mod: PBBFAC,,, | Performed by: PSYCHIATRY & NEUROLOGY

## 2018-12-05 RX ORDER — SERTRALINE HYDROCHLORIDE 50 MG/1
50 TABLET, FILM COATED ORAL DAILY
Qty: 30 TABLET | Refills: 11 | Status: SHIPPED | OUTPATIENT
Start: 2018-12-05 | End: 2019-06-06 | Stop reason: SDUPTHER

## 2018-12-05 NOTE — ASSESSMENT & PLAN NOTE
moderate to severe, but responds exquisitely to klonopin.  He feels there is still room for improvement.   -> start zoloft, consider imipramine, beta blockers.

## 2018-12-05 NOTE — PROGRESS NOTES
"I. Chief Complaints during this visit:   f/u visit for cognitive slowing, anxiety    History of present illness:   48 y.o. M seen in f/u for cognitive slowing, anxiety and new for obsessive-compulsive behavior (diagnosed by PCP).  This diagnosis was based on one particular behavior of polydipsia.  He endorses also compulsions with exercise, eating (food types), but do not limit his daily activities and seem to be more health-based than pathological.    Main concern is his anxiety levels.  He continues to feel klonopin helps control his anxiety and focus, so he self-titrates from 1/2-whole nightly.    Still occasionally has feeling of imbalance.    He was prescribed citalopram and then cymbalta to treat "depression."  But he did not think they helped with his anxiety.  He does not think he is depressed.    Sister also obsessive compulsive personality.        Interval history 4/7/16:  He feels more sharp when he takes klonopin at night, but is somewhat scared of taking this medication.  He is most concerned about his anxiety and inability.  Gait is unchanged.  Endorses depression.    From my note 6/2013:  ...urgently for cognitive slowing.  He was last seen in 2011 and says that he had been doing well.  He still felt a lot of benefit from 1mg of clonazepam at night on his cognition and even balance as whenever he took less, he felt these were adversely affected.  However, he had rhinoplasty last Friday to remove an infected implant and has felt more "slow" and very worried about his abilities to research and write.  He recognizes that the hydrocodone had negative effects on him as he has felt a little better every day that he hasn't taken it.  He thinks his balance is a little worse than 2 years ago, but still only appreciated by him.  Continues to have the nocturia.    My initial consult 2009:  37 yo  seen in consultation at the request of Dr. Aguilar for evaluation of imbalance and neck pain. He says " "that since ~January, he has felt a slight imbalance. This does not effect his gait or his abilities, but if he is sitting at a computer, for example, he feels like he is being pulled or on a boat. No specific maneuvers make this worse, except for closing his  eyes. Even weight lifting or turning his neck does not change this. He has occasional nausea with this. No double vision.  He also has a "pressure" in the back of his head. This started as neck pain which he thought was musculoskeletal and an injury from lifting. It moved up his neck and is now in the base of his skull. It is not painful, but feels "different". At night, he gets the sensation that it radiates to the top left of his head.  Last year, while in [place] for a meeting, he had an episode of vertigo. He was so vertiginous that he could not get out of bed and so called for an ambulance. That feeling of spinning resolved by the end of the day is not like his current feeling of imbalance.  He is undergoing evaluation for nocturia, but also complains of difficulty falling asleep due to the symptoms mentioned above and his anxiety/worry about the symptoms.      II. Review of systems As in HPI, otherwise, balance 3 systems reviewed and are negative.   III. Past Medical history: vertigo 2008; rhinoplasty   Family history: +cva, heart disease in father, DM in multiple family members   Social history: No tob or etoh, single, has advanced degree, currently employed as professor (biology) at [employer]. Family from the [facility]. Moved to  from [place] fall 2008. single.    Current medications: Per patient/ family report: klonopin 1mg qhs    Meds tried:  cymbalta (slowed cognition)    Allergies: Per patient/ family report: nkda       IV. Physical Exam       Vitals:    12/05/18 1248 12/05/18 1250 12/05/18 1251   BP: 137/76 126/76 129/70   Pulse: 65 70 69   Weight: 80.9 kg (178 lb 5.6 oz)     Height: 5' 8" (1.727 m)       General appearance: well nourished, well " developed, no distress  -------------------------------------------------------------   Facial Expression: Normal (0)   Affect: full, mildly anxious mood   Orientation to time & place: Oriented to time, place, person and situation   Speech: normal (0)   --------------------------------------------------------------   Cerebellar/ coordination:   Gait: normal   Finger-nose: no dysmetria       V. Laboratory/ Radiological Data: Reviewed:     No results found for: [id number]     From my note 2/19/09:  (personally) 1/23/09 CT head appears normal, though posterior fossa not well visualized.   1/23/09 Na 134, gluc 113, CRP 0.6, ARISTIDES neg, TSH 1.94, HIV neg.       VI. Medical Decision Making       Problem List Items Addressed This Visit        1 - High    Anxiety - Primary    Current Assessment & Plan     moderate to severe, but responds exquisitely to klonopin.  He feels there is still room for improvement.   -> start zoloft, consider imipramine, beta blockers.         Relevant Medications    sertraline (ZOLOFT) 50 MG tablet       2     Obsessive-compulsive behavior    Current Assessment & Plan     Does not meet DSM criteria for disorder, but has the behaviors.   -> start zoloft             Follow-up in about 1 year (around 12/5/2019).

## 2018-12-11 ENCOUNTER — PATIENT MESSAGE (OUTPATIENT)
Dept: ADMINISTRATIVE | Facility: OTHER | Age: 48
End: 2018-12-11

## 2018-12-11 NOTE — TELEPHONE ENCOUNTER
Provider notified patient due for labs.     Ravi Nogueira, LEYDI.Ph., AAHIVP  Clinical Pharmacist, HIV/HCV  [facility]  Phone: [phone]

## 2018-12-27 ENCOUNTER — PATIENT MESSAGE (OUTPATIENT)
Dept: INTERNAL MEDICINE | Facility: CLINIC | Age: 48
End: 2018-12-27

## 2018-12-27 DIAGNOSIS — G47.00 INSOMNIA, UNSPECIFIED TYPE: Primary | ICD-10-CM

## 2018-12-27 RX ORDER — ZOLPIDEM TARTRATE 5 MG/1
5 TABLET ORAL NIGHTLY PRN
Qty: 5 TABLET | Refills: 0 | Status: SHIPPED | OUTPATIENT
Start: 2018-12-27 | End: 2019-03-21

## 2018-12-28 ENCOUNTER — TELEPHONE (OUTPATIENT)
Dept: INTERNAL MEDICINE | Facility: CLINIC | Age: 48
End: 2018-12-28

## 2019-01-04 ENCOUNTER — PATIENT MESSAGE (OUTPATIENT)
Dept: ADMINISTRATIVE | Facility: OTHER | Age: 49
End: 2019-01-04

## 2019-01-14 DIAGNOSIS — Z12.11 COLON CANCER SCREENING: ICD-10-CM

## 2019-03-21 ENCOUNTER — OFFICE VISIT (OUTPATIENT)
Dept: URGENT CARE | Facility: CLINIC | Age: 49
End: 2019-03-21
Payer: COMMERCIAL

## 2019-03-21 VITALS
HEART RATE: 74 BPM | TEMPERATURE: 98 F | DIASTOLIC BLOOD PRESSURE: 67 MMHG | BODY MASS INDEX: 25.76 KG/M2 | HEIGHT: 68 IN | OXYGEN SATURATION: 98 % | RESPIRATION RATE: 18 BRPM | WEIGHT: 170 LBS | SYSTOLIC BLOOD PRESSURE: 111 MMHG

## 2019-03-21 DIAGNOSIS — J32.9 BACTERIAL SINUSITIS: Primary | ICD-10-CM

## 2019-03-21 DIAGNOSIS — B96.89 BACTERIAL SINUSITIS: Primary | ICD-10-CM

## 2019-03-21 PROCEDURE — 99214 OFFICE O/P EST MOD 30 MIN: CPT | Mod: 25,S$GLB,, | Performed by: NURSE PRACTITIONER

## 2019-03-21 PROCEDURE — 99214 PR OFFICE/OUTPT VISIT, EST, LEVL IV, 30-39 MIN: ICD-10-PCS | Mod: 25,S$GLB,, | Performed by: NURSE PRACTITIONER

## 2019-03-21 PROCEDURE — 96372 PR INJECTION,THERAP/PROPH/DIAG2ST, IM OR SUBCUT: ICD-10-PCS | Mod: S$GLB,,, | Performed by: NURSE PRACTITIONER

## 2019-03-21 PROCEDURE — 3008F BODY MASS INDEX DOCD: CPT | Mod: CPTII,S$GLB,, | Performed by: NURSE PRACTITIONER

## 2019-03-21 PROCEDURE — 96372 THER/PROPH/DIAG INJ SC/IM: CPT | Mod: S$GLB,,, | Performed by: NURSE PRACTITIONER

## 2019-03-21 PROCEDURE — 3008F PR BODY MASS INDEX (BMI) DOCUMENTED: ICD-10-PCS | Mod: CPTII,S$GLB,, | Performed by: NURSE PRACTITIONER

## 2019-03-21 RX ORDER — DOXYCYCLINE 100 MG/1
100 CAPSULE ORAL 2 TIMES DAILY
Qty: 14 CAPSULE | Refills: 0 | Status: SHIPPED | OUTPATIENT
Start: 2019-03-21 | End: 2019-03-28

## 2019-03-21 RX ORDER — FLUTICASONE PROPIONATE 50 MCG
1 SPRAY, SUSPENSION (ML) NASAL DAILY
Qty: 9.9 ML | Refills: 0 | Status: SHIPPED | OUTPATIENT
Start: 2019-03-21 | End: 2020-11-10

## 2019-03-21 RX ORDER — BETAMETHASONE SODIUM PHOSPHATE AND BETAMETHASONE ACETATE 3; 3 MG/ML; MG/ML
9 INJECTION, SUSPENSION INTRA-ARTICULAR; INTRALESIONAL; INTRAMUSCULAR; SOFT TISSUE
Status: COMPLETED | OUTPATIENT
Start: 2019-03-21 | End: 2019-03-21

## 2019-03-21 RX ADMIN — BETAMETHASONE SODIUM PHOSPHATE AND BETAMETHASONE ACETATE 9 MG: 3; 3 INJECTION, SUSPENSION INTRA-ARTICULAR; INTRALESIONAL; INTRAMUSCULAR; SOFT TISSUE at 04:03

## 2019-03-21 NOTE — PROGRESS NOTES
"Subjective:       Patient ID: Robby Maya is a 48 y.o. male.    Vitals:  height is 5' 8" (1.727 m) and weight is 77.1 kg (170 lb). His temperature is 98.4 °F (36.9 °C). His blood pressure is 111/67 and his pulse is 74. His respiration is 18 and oxygen saturation is 98%.     Chief Complaint: URI    Patient states had a cough and congestion for 2 weeks, states got better over the weekend and now it is back again and feeling worse.       URI    This is a recurrent problem. The current episode started 1 to 4 weeks ago. The problem has been gradually worsening. There has been no fever. Associated symptoms include congestion, coughing and a sore throat. Pertinent negatives include no ear pain, nausea, rash, sinus pain, vomiting or wheezing. Treatments tried: took advil pm. The treatment provided no relief.       Constitution: Positive for chills. Negative for sweating, fatigue and fever.   HENT: Positive for congestion, sinus pressure and sore throat. Negative for ear pain, sinus pain and voice change.    Neck: Negative for painful lymph nodes.   Eyes: Negative for eye redness.   Respiratory: Positive for cough. Negative for chest tightness, sputum production, bloody sputum, COPD, shortness of breath, stridor, wheezing and asthma.    Gastrointestinal: Negative for nausea and vomiting.   Musculoskeletal: Negative for muscle ache.   Skin: Negative for rash.   Allergic/Immunologic: Negative for seasonal allergies and asthma.   Hematologic/Lymphatic: Negative for swollen lymph nodes.       Objective:      Physical Exam   Constitutional: He is oriented to person, place, and time. Vital signs are normal. He appears well-developed and well-nourished. He is cooperative.  Non-toxic appearance. He does not have a sickly appearance. He does not appear ill. No distress.   HENT:   Head: Normocephalic and atraumatic.   Right Ear: Hearing, tympanic membrane, external ear and ear canal normal.   Left Ear: Hearing, tympanic " membrane, external ear and ear canal normal.   Nose: Mucosal edema and rhinorrhea present. Right sinus exhibits maxillary sinus tenderness. Left sinus exhibits maxillary sinus tenderness.   Mouth/Throat: Uvula is midline and mucous membranes are normal. Posterior oropharyngeal erythema present. No oropharyngeal exudate, posterior oropharyngeal edema or tonsillar abscesses. No tonsillar exudate.   Eyes: Conjunctivae and EOM are normal. Pupils are equal, round, and reactive to light.   Neck: Neck supple. No JVD present.   Cardiovascular: Normal rate, regular rhythm, normal heart sounds and intact distal pulses. Exam reveals no decreased pulses.   Pulses:       Carotid pulses are 2+ on the right side, and 2+ on the left side.       Radial pulses are 2+ on the right side, and 2+ on the left side.   Pulmonary/Chest: Effort normal and breath sounds normal.   Musculoskeletal: Normal range of motion.   Lymphadenopathy:        Head (right side): No submental, no submandibular, no tonsillar, no preauricular and no posterior auricular adenopathy present.        Head (left side): No submental, no submandibular, no tonsillar, no preauricular and no posterior auricular adenopathy present.     He has no cervical adenopathy.   Neurological: He is alert and oriented to person, place, and time. Gait normal.   Skin: Skin is warm. Capillary refill takes less than 2 seconds. No rash noted.   Psychiatric: He has a normal mood and affect. His behavior is normal. Judgment and thought content normal.   Nursing note and vitals reviewed.      Assessment:       1. Bacterial sinusitis        Plan:         Bacterial sinusitis  -     betamethasone acetate-betamethasone sodium phosphate injection 9 mg  -     doxycycline (VIBRAMYCIN) 100 MG Cap; Take 1 capsule (100 mg total) by mouth 2 (two) times daily. for 7 days  Dispense: 14 capsule; Refill: 0    Other orders  -     fluticasone (FLONASE) 50 mcg/actuation nasal spray; 1 spray (50 mcg total) by  Each Nare route once daily.  Dispense: 9.9 mL; Refill: 0      Patient Instructions   You received a steroid shot today - this can elevate your blood pressure, elevate your blood sugar, water weight gain, nervous energy, redness to the face and dimpling of the skin where the shot goes in.     Start taking your antibiotics and take for the full duration.     Zyrtec, Claritin, or Allegra OTC as directed for the next 7 days  Add a decongestant to your antihistamine for congestion- like Zyrtec-D, Claritin D, Allegra D-this may increase your blood pressure.   If high blood pressure, an alternative decongestant is Coricidin HBP   Flonase OTC as directed for the next 7 days  Salt Water Nasal Spray OTC 3x/day for the next 7 days   You can try breathe right strips at night to help you breathe.  A cool mist humidifier in bedroom may help with cough and relieve stuffy nose.      Follow up with Primary Care or ENT if not improved in 7-10 Days  Follow up with your PCP or specialty clinic within the week if not improved or as needed.  You can call [phone] to schedule an appointment with the appropriate provider.  You must understand that you've received an Urgent Care treatment only and that you may be released before all your medical problems are known or treated. You, the patient, will arrange for follow up care as instructed.  If your condition worsens we recommend that you receive another evaluation at the emergency room immediately or contact your primary medical clinics after hours call service to discuss your concerns.  Please return here or go to the Emergency Department for any concerns or worsening of condition.            Sinusitis (Antibiotic Treatment)    The sinuses are air-filled spaces within the bones of the face. They connect to the inside of the nose. Sinusitis is an inflammation of the tissue lining the sinus cavity. Sinus inflammation can occur during a cold. It can also be due to allergies to pollens  "and other particles in the air. Sinusitis can cause symptoms of sinus congestion and fullness. A sinus infection causes fever, headache and facial pain. There is often green or yellow drainage from the nose or into the back of the throat (post-nasal drip). You have been given antibiotics to treat this condition.    Please return here or go to the Emergency Department for any concerns or worsening of condition.    Home care:  · Take the full course of antibiotics as instructed. Do not stop taking them, even if you feel better.  · Get rest!  · Drink plenty of water, hot tea, and other liquids. This may help thin mucus. It also may promote sinus drainage.  · Heat may help soothe painful areas of the face. Use a towel soaked in hot water. Or,  the shower and direct the hot spray onto your face. Using a vaporizer along with a menthol rub at night may also help.   · An expectorant containing guaifenesin may help thin the mucus and promote drainage from the sinuses.  · If you are a female and on birth control pills and take the antibiotics, use additional methods to prevent pregnancy while on the antibiotics and for one cycle after.  · Flonase (fluticasone) is an over the counter nasal spray which may help with your symptoms.  · Zyrtec D, Claritin D, or Allegra D can also help with symptoms of congestion and drainage  · If you have hypertesion, avoid using the "D" which is a decongestant.  · If clear drainage, you can take plain zyrtec, claritin, allegra.  · If congested, you can take pseudoephedrine-you need to ask for this behind the pharmacy counter-do not take if you have high blood pressure. Pheylephrine is on the shelf and is not effective.  · Tylenol or ibuprofen can be used as directed for pain, unless you have allergies. Avoid ibuprofen if medical conditions such as stomach ulcers, kidney or liver disease, or blood thinners  · Afrin is effective if flying in the next few days. Take as directed for the " airplane flight upon taking off and landing. Do not continue to use Afrin as it will cause rebound congestion.  · Don't smoke. This can worsen symptoms.  Follow-up care  Follow up with your healthcare provider or our staff if you are not improving within the next week.    When to seek medical advice  Call your healthcare provider if any of these occur:  · Facial pain or headache becoming more severe  · Stiff neck  · Unusual drowsiness or confusion  · Swelling of the forehead or eyelids  · Vision problems, including blurred or double vision  · Fever of 100.4ºF (38ºC) or higher, or as directed by your healthcare provider  · Seizure  · Breathing problems  · Symptoms not resolving within 10 days

## 2019-03-21 NOTE — PATIENT INSTRUCTIONS
You received a steroid shot today - this can elevate your blood pressure, elevate your blood sugar, water weight gain, nervous energy, redness to the face and dimpling of the skin where the shot goes in.     Start taking your antibiotics and take for the full duration.     Zyrtec, Claritin, or Allegra OTC as directed for the next 7 days  Add a decongestant to your antihistamine for congestion- like Zyrtec-D, Claritin D, Allegra D-this may increase your blood pressure.   If high blood pressure, an alternative decongestant is Coricidin HBP   Flonase OTC as directed for the next 7 days  Salt Water Nasal Spray OTC 3x/day for the next 7 days   You can try breathe right strips at night to help you breathe.  A cool mist humidifier in bedroom may help with cough and relieve stuffy nose.      Follow up with Primary Care or ENT if not improved in 7-10 Days  Follow up with your PCP or specialty clinic within the week if not improved or as needed.  You can call [phone] to schedule an appointment with the appropriate provider.  You must understand that you've received an Urgent Care treatment only and that you may be released before all your medical problems are known or treated. You, the patient, will arrange for follow up care as instructed.  If your condition worsens we recommend that you receive another evaluation at the emergency room immediately or contact your primary medical clinics after hours call service to discuss your concerns.  Please return here or go to the Emergency Department for any concerns or worsening of condition.            Sinusitis (Antibiotic Treatment)    The sinuses are air-filled spaces within the bones of the face. They connect to the inside of the nose. Sinusitis is an inflammation of the tissue lining the sinus cavity. Sinus inflammation can occur during a cold. It can also be due to allergies to pollens and other particles in the air. Sinusitis can cause symptoms of sinus congestion and  "fullness. A sinus infection causes fever, headache and facial pain. There is often green or yellow drainage from the nose or into the back of the throat (post-nasal drip). You have been given antibiotics to treat this condition.    Please return here or go to the Emergency Department for any concerns or worsening of condition.    Home care:  · Take the full course of antibiotics as instructed. Do not stop taking them, even if you feel better.  · Get rest!  · Drink plenty of water, hot tea, and other liquids. This may help thin mucus. It also may promote sinus drainage.  · Heat may help soothe painful areas of the face. Use a towel soaked in hot water. Or,  the shower and direct the hot spray onto your face. Using a vaporizer along with a menthol rub at night may also help.   · An expectorant containing guaifenesin may help thin the mucus and promote drainage from the sinuses.  · If you are a female and on birth control pills and take the antibiotics, use additional methods to prevent pregnancy while on the antibiotics and for one cycle after.  · Flonase (fluticasone) is an over the counter nasal spray which may help with your symptoms.  · Zyrtec D, Claritin D, or Allegra D can also help with symptoms of congestion and drainage  · If you have hypertesion, avoid using the "D" which is a decongestant.  · If clear drainage, you can take plain zyrtec, claritin, allegra.  · If congested, you can take pseudoephedrine-you need to ask for this behind the pharmacy counter-do not take if you have high blood pressure. Pheylephrine is on the shelf and is not effective.  · Tylenol or ibuprofen can be used as directed for pain, unless you have allergies. Avoid ibuprofen if medical conditions such as stomach ulcers, kidney or liver disease, or blood thinners  · Afrin is effective if flying in the next few days. Take as directed for the airplane flight upon taking off and landing. Do not continue to use Afrin as it will " cause rebound congestion.  · Don't smoke. This can worsen symptoms.  Follow-up care  Follow up with your healthcare provider or our staff if you are not improving within the next week.    When to seek medical advice  Call your healthcare provider if any of these occur:  · Facial pain or headache becoming more severe  · Stiff neck  · Unusual drowsiness or confusion  · Swelling of the forehead or eyelids  · Vision problems, including blurred or double vision  · Fever of 100.4ºF (38ºC) or higher, or as directed by your healthcare provider  · Seizure  · Breathing problems  · Symptoms not resolving within 10 days

## 2019-06-03 ENCOUNTER — TELEPHONE (OUTPATIENT)
Dept: INTERNAL MEDICINE | Facility: CLINIC | Age: 49
End: 2019-06-03

## 2019-06-03 NOTE — TELEPHONE ENCOUNTER
----- Message from Marie Jon sent at 6/3/2019 11:52 AM CDT -----  Contact: [phone]  Patient is requesting lab work before his annual physical appointment 6/6.  Please advise, thanks

## 2019-06-03 NOTE — TELEPHONE ENCOUNTER
Please call Robby.  I do lab work after the annual physical b/c I don't know what he's going to need until he comes to see me.      D

## 2019-06-06 ENCOUNTER — LAB VISIT (OUTPATIENT)
Dept: LAB | Facility: HOSPITAL | Age: 49
End: 2019-06-06
Attending: INTERNAL MEDICINE
Payer: COMMERCIAL

## 2019-06-06 ENCOUNTER — PATIENT MESSAGE (OUTPATIENT)
Dept: INTERNAL MEDICINE | Facility: CLINIC | Age: 49
End: 2019-06-06

## 2019-06-06 ENCOUNTER — OFFICE VISIT (OUTPATIENT)
Dept: INTERNAL MEDICINE | Facility: CLINIC | Age: 49
End: 2019-06-06
Payer: COMMERCIAL

## 2019-06-06 VITALS
WEIGHT: 175.94 LBS | OXYGEN SATURATION: 99 % | SYSTOLIC BLOOD PRESSURE: 122 MMHG | DIASTOLIC BLOOD PRESSURE: 84 MMHG | HEART RATE: 55 BPM | BODY MASS INDEX: 26.66 KG/M2 | HEIGHT: 68 IN

## 2019-06-06 DIAGNOSIS — Z11.3 SCREEN FOR STD (SEXUALLY TRANSMITTED DISEASE): ICD-10-CM

## 2019-06-06 DIAGNOSIS — R46.81 OBSESSIVE-COMPULSIVE BEHAVIOR: ICD-10-CM

## 2019-06-06 DIAGNOSIS — F41.9 ANXIETY: ICD-10-CM

## 2019-06-06 DIAGNOSIS — Z00.00 ROUTINE HISTORY AND PHYSICAL EXAMINATION OF ADULT: ICD-10-CM

## 2019-06-06 DIAGNOSIS — N52.9 ERECTILE DYSFUNCTION, UNSPECIFIED ERECTILE DYSFUNCTION TYPE: ICD-10-CM

## 2019-06-06 DIAGNOSIS — Z00.00 ROUTINE HISTORY AND PHYSICAL EXAMINATION OF ADULT: Primary | ICD-10-CM

## 2019-06-06 DIAGNOSIS — M54.2 NECK PAIN: ICD-10-CM

## 2019-06-06 LAB
ALBUMIN SERPL BCP-MCNC: 4.3 G/DL (ref 3.5–5.2)
ALP SERPL-CCNC: 72 U/L (ref 55–135)
ALT SERPL W/O P-5'-P-CCNC: 29 U/L (ref 10–44)
ANION GAP SERPL CALC-SCNC: 8 MMOL/L (ref 8–16)
AST SERPL-CCNC: 40 U/L (ref 10–40)
BILIRUB SERPL-MCNC: 0.5 MG/DL (ref 0.1–1)
BUN SERPL-MCNC: 15 MG/DL (ref 6–20)
CALCIUM SERPL-MCNC: 10 MG/DL (ref 8.7–10.5)
CHLORIDE SERPL-SCNC: 100 MMOL/L (ref 95–110)
CHOLEST SERPL-MCNC: 195 MG/DL (ref 120–199)
CHOLEST/HDLC SERPL: 2.9 {RATIO} (ref 2–5)
CO2 SERPL-SCNC: 30 MMOL/L (ref 23–29)
COMPLEXED PSA SERPL-MCNC: 0.71 NG/ML (ref 0–4)
CREAT SERPL-MCNC: 0.9 MG/DL (ref 0.5–1.4)
EST. GFR  (AFRICAN AMERICAN): >60 ML/MIN/1.73 M^2
EST. GFR  (NON AFRICAN AMERICAN): >60 ML/MIN/1.73 M^2
GLUCOSE SERPL-MCNC: 94 MG/DL (ref 70–110)
HDLC SERPL-MCNC: 68 MG/DL (ref 40–75)
HDLC SERPL: 34.9 % (ref 20–50)
LDLC SERPL CALC-MCNC: 110.8 MG/DL (ref 63–159)
NONHDLC SERPL-MCNC: 127 MG/DL
POTASSIUM SERPL-SCNC: 3.9 MMOL/L (ref 3.5–5.1)
PROT SERPL-MCNC: 7.7 G/DL (ref 6–8.4)
SODIUM SERPL-SCNC: 138 MMOL/L (ref 136–145)
TRIGL SERPL-MCNC: 81 MG/DL (ref 30–150)

## 2019-06-06 PROCEDURE — 99396 PR PREVENTIVE VISIT,EST,40-64: ICD-10-PCS | Mod: S$GLB,,, | Performed by: INTERNAL MEDICINE

## 2019-06-06 PROCEDURE — 80061 LIPID PANEL: CPT

## 2019-06-06 PROCEDURE — 86592 SYPHILIS TEST NON-TREP QUAL: CPT

## 2019-06-06 PROCEDURE — 84153 ASSAY OF PSA TOTAL: CPT

## 2019-06-06 PROCEDURE — 80053 COMPREHEN METABOLIC PANEL: CPT

## 2019-06-06 PROCEDURE — 86703 HIV-1/HIV-2 1 RESULT ANTBDY: CPT

## 2019-06-06 PROCEDURE — 99999 PR PBB SHADOW E&M-EST. PATIENT-LVL IV: CPT | Mod: PBBFAC,,, | Performed by: INTERNAL MEDICINE

## 2019-06-06 PROCEDURE — 99999 PR PBB SHADOW E&M-EST. PATIENT-LVL IV: ICD-10-PCS | Mod: PBBFAC,,, | Performed by: INTERNAL MEDICINE

## 2019-06-06 PROCEDURE — 36415 COLL VENOUS BLD VENIPUNCTURE: CPT

## 2019-06-06 PROCEDURE — 87491 CHLMYD TRACH DNA AMP PROBE: CPT

## 2019-06-06 PROCEDURE — 99396 PREV VISIT EST AGE 40-64: CPT | Mod: S$GLB,,, | Performed by: INTERNAL MEDICINE

## 2019-06-06 RX ORDER — SERTRALINE HYDROCHLORIDE 50 MG/1
75 TABLET, FILM COATED ORAL DAILY
Qty: 135 TABLET | Refills: 3 | Status: SHIPPED | OUTPATIENT
Start: 2019-06-06 | End: 2020-08-23

## 2019-06-06 RX ORDER — MELOXICAM 7.5 MG/1
7.5 TABLET ORAL DAILY
Qty: 60 TABLET | Refills: 3 | Status: SHIPPED | OUTPATIENT
Start: 2019-06-06 | End: 2020-11-10

## 2019-06-06 RX ORDER — SILDENAFIL 100 MG/1
100 TABLET, FILM COATED ORAL DAILY PRN
Qty: 4 TABLET | Refills: 9 | Status: SHIPPED | OUTPATIENT
Start: 2019-06-06 | End: 2020-11-10

## 2019-06-06 NOTE — PROGRESS NOTES
Subjective:       Patient ID: Robby Maya is a 48 y.o. male.    Chief Complaint: Annual Exam    HPI - Robby feels well today.  He has stopped klonopin and increased his sertraline to 75 mg daily.  Anxiety is under control, but OCD behaviors are not. Wonders if he should see a psychiatrist.  Some neck pain; requests meloxicam refill.  Not on truvada at this time; not sexually active at the moment.  He's having some ED problems from the sertraline.      PMH:  Anxiety  Obsessive-Compulsive behaviors  Psychogenic Polydipsia     Meds: only klonopin.    Review of Systems   Constitutional: Negative for fever.   HENT: Negative for congestion.    Respiratory: Negative for shortness of breath.    Cardiovascular: Negative for chest pain.   Gastrointestinal: Negative for abdominal pain.   Genitourinary: Negative for difficulty urinating.   Musculoskeletal: Positive for neck pain.   Skin: Negative for rash.   Neurological: Negative for headaches.   Psychiatric/Behavioral: Negative for sleep disturbance.       Objective:      Physical Exam   Constitutional: He is oriented to person, place, and time. He appears well-developed and well-nourished. No distress.   Well-appearing, physically fit man in no distress.   HENT:   Head: Normocephalic and atraumatic.   Cardiovascular: Normal rate, regular rhythm and normal heart sounds. Exam reveals no gallop and no friction rub.   No murmur heard.  Pulmonary/Chest: No stridor. No respiratory distress. He has no wheezes. He has no rales. He exhibits no tenderness.   Neurological: He is alert and oriented to person, place, and time.   Skin: Skin is warm. He is not diaphoretic. No erythema.   Psychiatric: He has a normal mood and affect. Thought content normal.   Nursing note and vitals reviewed.      Assessment:       1. Routine history and physical examination of adult    2. Obsessive-compulsive behavior    3. Anxiety    4. Erectile dysfunction, unspecified erectile dysfunction type     5. Screen for STD (sexually transmitted disease)    6. Neck pain        Plan:       Robby was seen today for annual exam.    Diagnoses and all orders for this visit:    Routine history and physical examination of adult - healthy man, o/t anxiety and OCD behaviors.  Updating labs.  He has made 50 and due for colonoscopy.  Wants to have PSA screening annually  -     Comprehensive metabolic panel; Future  -     Lipid panel; Future  -     Case request GI: COLONOSCOPY; Future  -     PSA, Screening; Future    Obsessive-compulsive behavior  -     Ambulatory consult to Psychiatry    Anxiety  -     Ambulatory consult to Psychiatry  -     sertraline (ZOLOFT) 50 MG tablet; Take 1.5 tablets (75 mg total) by mouth once daily.    Erectile dysfunction, unspecified erectile dysfunction type - likely d/t zoloft.  Will give a trial of viagra  -     sildenafil (VIAGRA) 100 MG tablet; Take 1 tablet (100 mg total) by mouth daily as needed for Erectile Dysfunction.    Screen for STD (sexually transmitted disease) - screening at his request  -     HIV 1/2 Ag/Ab (4th Gen); Future  -     RPR; Future  -     C. trachomatis/N. gonorrhoeae by AMP DNA    Neck pain - it's reasonable to use this for pain relief  -     meloxicam (MOBIC) 7.5 MG tablet; Take 1 tablet (7.5 mg total) by mouth once daily.    rtc prn, or in a year.    JIHAN Velarde MD MPH  Staff Internist

## 2019-06-07 LAB
C TRACH DNA SPEC QL NAA+PROBE: NOT DETECTED
HIV 1+2 AB+HIV1 P24 AG SERPL QL IA: NEGATIVE
N GONORRHOEA DNA SPEC QL NAA+PROBE: NOT DETECTED
RPR SER QL: NORMAL

## 2020-06-30 ENCOUNTER — TELEPHONE (OUTPATIENT)
Dept: INTERNAL MEDICINE | Facility: CLINIC | Age: 50
End: 2020-06-30

## 2020-06-30 NOTE — TELEPHONE ENCOUNTER
Called pt again and he did not go to the ER like he agreed to. He declined; still advised pt to go the ER, he wants something for headaches.    Susana

## 2020-06-30 NOTE — TELEPHONE ENCOUNTER
----- Message from Heriberto Gamboa sent at 6/30/2020  3:43 PM CDT -----  Contact: [phone]  Would like to receive medical advice.  Symptoms: positive HIV & having a lot of symptoms     How long has the patient had these symptoms:  Last week    Would they like a call back or a response via [employer]ner:  Call back     Additional information:  Please call to advise. Pt does have an appt scheduled but said he cannot wait until 7/2.

## 2020-06-30 NOTE — TELEPHONE ENCOUNTER
Called pt and confirmed that he is not HIV positive the msg was taken wrong.  Pt is Covid positive since 06/25/2020 and stated that he feels terrible and wants to be seen ASAP.  Little short of breathe and congestated  He booked an appt to see us on 07/02/2020 at 8:00 AM but  stated that he cannot wait until then.  Consulted with Yohana Louise LPN and pt was advised to head to the ER ASAP for further evaluation.  Pt verbalized understanding.    Susana

## 2020-07-01 NOTE — TELEPHONE ENCOUNTER
I spoke to him at length.  He has a very severe anxiety disorder, and his positive COVID test has amplified his anxiety.  Provided reassurance.  Gave a plan for treatment of both his anxiety and COVID.    Increase zoloft to 100 mg daily through this time.  Take tylenol for aches and pains and fevers.  Must stay home and physically isolated until 10 days after first symptoms.      Happy to talk to him again if necessary.  Do NOT come for an appointment - he has active COVID-19    D

## 2020-07-14 ENCOUNTER — PATIENT MESSAGE (OUTPATIENT)
Dept: INTERNAL MEDICINE | Facility: CLINIC | Age: 50
End: 2020-07-14

## 2020-07-15 ENCOUNTER — PATIENT MESSAGE (OUTPATIENT)
Dept: INTERNAL MEDICINE | Facility: CLINIC | Age: 50
End: 2020-07-15

## 2020-07-15 RX ORDER — AZITHROMYCIN 250 MG/1
TABLET, FILM COATED ORAL
Qty: 6 TABLET | Refills: 0 | Status: SHIPPED | OUTPATIENT
Start: 2020-07-15 | End: 2020-07-20

## 2020-09-28 ENCOUNTER — PATIENT MESSAGE (OUTPATIENT)
Dept: NEUROLOGY | Facility: CLINIC | Age: 50
End: 2020-09-28

## 2020-10-05 ENCOUNTER — PATIENT MESSAGE (OUTPATIENT)
Dept: ADMINISTRATIVE | Facility: HOSPITAL | Age: 50
End: 2020-10-05

## 2020-11-10 ENCOUNTER — PATIENT MESSAGE (OUTPATIENT)
Dept: INTERNAL MEDICINE | Facility: CLINIC | Age: 50
End: 2020-11-10

## 2020-11-10 ENCOUNTER — TELEPHONE (OUTPATIENT)
Dept: INTERNAL MEDICINE | Facility: CLINIC | Age: 50
End: 2020-11-10

## 2020-11-10 ENCOUNTER — OFFICE VISIT (OUTPATIENT)
Dept: INTERNAL MEDICINE | Facility: CLINIC | Age: 50
End: 2020-11-10
Payer: COMMERCIAL

## 2020-11-10 VITALS
OXYGEN SATURATION: 99 % | DIASTOLIC BLOOD PRESSURE: 72 MMHG | BODY MASS INDEX: 26.76 KG/M2 | HEIGHT: 68 IN | WEIGHT: 176.56 LBS | SYSTOLIC BLOOD PRESSURE: 120 MMHG | HEART RATE: 72 BPM

## 2020-11-10 DIAGNOSIS — R00.2 PALPITATIONS: ICD-10-CM

## 2020-11-10 DIAGNOSIS — Z00.00 ROUTINE HISTORY AND PHYSICAL EXAMINATION OF ADULT: Primary | ICD-10-CM

## 2020-11-10 DIAGNOSIS — F41.9 ANXIETY: ICD-10-CM

## 2020-11-10 DIAGNOSIS — R63.1 PSYCHOGENIC POLYDIPSIA: ICD-10-CM

## 2020-11-10 DIAGNOSIS — F54 PSYCHOGENIC POLYDIPSIA: ICD-10-CM

## 2020-11-10 DIAGNOSIS — Z12.5 SCREENING FOR PROSTATE CANCER: ICD-10-CM

## 2020-11-10 DIAGNOSIS — R46.81 OBSESSIVE-COMPULSIVE BEHAVIOR: ICD-10-CM

## 2020-11-10 LAB
BILIRUB UR QL STRIP: NEGATIVE
CLARITY UR REFRACT.AUTO: ABNORMAL
COLOR UR AUTO: YELLOW
GLUCOSE UR QL STRIP: NEGATIVE
HGB UR QL STRIP: NEGATIVE
KETONES UR QL STRIP: NEGATIVE
LEUKOCYTE ESTERASE UR QL STRIP: NEGATIVE
MICROSCOPIC COMMENT: NORMAL
NITRITE UR QL STRIP: NEGATIVE
PH UR STRIP: 7 [PH] (ref 5–8)
PROT UR QL STRIP: NEGATIVE
SP GR UR STRIP: 1.01 (ref 1–1.03)
URN SPEC COLLECT METH UR: ABNORMAL

## 2020-11-10 PROCEDURE — 99999 PR PBB SHADOW E&M-EST. PATIENT-LVL III: CPT | Mod: PBBFAC,,, | Performed by: INTERNAL MEDICINE

## 2020-11-10 PROCEDURE — 99396 PREV VISIT EST AGE 40-64: CPT | Mod: S$GLB,,, | Performed by: INTERNAL MEDICINE

## 2020-11-10 PROCEDURE — 3008F PR BODY MASS INDEX (BMI) DOCUMENTED: ICD-10-PCS | Mod: CPTII,S$GLB,, | Performed by: INTERNAL MEDICINE

## 2020-11-10 PROCEDURE — 99999 PR PBB SHADOW E&M-EST. PATIENT-LVL III: ICD-10-PCS | Mod: PBBFAC,,, | Performed by: INTERNAL MEDICINE

## 2020-11-10 PROCEDURE — 3008F BODY MASS INDEX DOCD: CPT | Mod: CPTII,S$GLB,, | Performed by: INTERNAL MEDICINE

## 2020-11-10 PROCEDURE — 99396 PR PREVENTIVE VISIT,EST,40-64: ICD-10-PCS | Mod: S$GLB,,, | Performed by: INTERNAL MEDICINE

## 2020-11-10 PROCEDURE — 81001 URINALYSIS AUTO W/SCOPE: CPT

## 2020-11-10 RX ORDER — SERTRALINE HYDROCHLORIDE 50 MG/1
75 TABLET, FILM COATED ORAL DAILY
Qty: 90 TABLET | Refills: 3 | Status: SHIPPED | OUTPATIENT
Start: 2020-11-10 | End: 2021-07-09

## 2020-11-10 NOTE — TELEPHONE ENCOUNTER
----- Message from Lidia Quiros sent at 11/10/2020 11:35 AM CST -----  Contact: self [phone]  Doctor appointment and lab have been scheduled.  Please link lab orders to the lab appointment.  Date of doctor appointment:  11/10  Date of lab appointment:  111/10  Physical or F/U: physical  Comments: PSA and CMP  Pt states his lab work is covered at [facility] by his insurance

## 2020-11-10 NOTE — PROGRESS NOTES
Subjective:       Patient ID: Robby Maya is a 50 y.o. male.    Chief Complaint:   Annual Exam    HPI - He sent me a long email with multiple complaints this am, all revolving around his OCD and anxiety disorder.  He drinks minimally; has not been sexually active in over a year.  He's a nonsmoker.  Works as a .  He works out compulsively.  Worried that he's drinking too much water again and wants a check of his sodium.  Insurance requires labs to be done at [facility].    PMH:  Anxiety  Obsessive-Compulsive behaviors  Psychogenic Polydipsia     Meds: Reviewed and reconciled in EPIC with patient during visit today.    Review of Systems   Constitutional: Negative for fever.   HENT: Negative for congestion.    Respiratory: Negative for shortness of breath.    Cardiovascular: Negative for chest pain.   Gastrointestinal: Negative for abdominal pain.   Genitourinary: Negative for difficulty urinating.   Musculoskeletal: Negative for arthralgias.   Skin: Negative for rash.   Neurological: Negative for headaches.   Psychiatric/Behavioral: The patient is nervous/anxious and is hyperactive.        Objective:      Physical Exam  Vitals signs reviewed.   Constitutional:       General: He is not in acute distress.     Appearance: Normal appearance. He is well-developed. He is not ill-appearing or diaphoretic.      Comments: Muscular, well-appearing man with pressured speech   HENT:      Head: Normocephalic and atraumatic.   Cardiovascular:      Rate and Rhythm: Normal rate and regular rhythm.      Heart sounds: Normal heart sounds. No murmur. No friction rub. No gallop.    Pulmonary:      Effort: No respiratory distress.      Breath sounds: No wheezing or rales.   Chest:      Chest wall: No tenderness.   Skin:     General: Skin is warm.      Findings: No erythema.   Neurological:      Mental Status: He is alert and oriented to person, place, and time.   Psychiatric:         Thought Content: Thought content  normal.         Assessment:       1. Routine history and physical examination of adult    2. Anxiety    3. Obsessive-compulsive behavior    4. Psychogenic polydipsia    5. Palpitations    6. Screening for prostate cancer        Plan:       Robby was seen today for annual exam.    Diagnoses and all orders for this visit:    Routine history and physical examination of adult - agreed to FIT testing.  Lab work mostly up to date.    -     Fecal Immunochemical Test (iFOBT); Future    Anxiety - strongly recommended increasing this dose to 75, and he will try to do this  -     sertraline (ZOLOFT) 50 MG tablet; Take 1.5 tablets (75 mg total) by mouth once daily.    Obsessive-compulsive behavior    Psychogenic polydipsia - he understands the causes, and will continue to control his water intatke  -     Urinalysis  -     Comprehensive Metabolic Panel; Future  -     Comprehensive Metabolic Panel    Palpitations - minor complaint.  I wanted to do an EKG, but he was so overly concerned about controlling costs that I canceled it.    Screening for prostate cancer - due for psa  -     PSA, Screening; Future    rtc prn, or in a year for ANDREW Velarde MD MPH  Staff Internist

## 2020-11-12 ENCOUNTER — LAB VISIT (OUTPATIENT)
Dept: LAB | Facility: HOSPITAL | Age: 50
End: 2020-11-12
Attending: INTERNAL MEDICINE
Payer: COMMERCIAL

## 2020-11-12 DIAGNOSIS — R63.1 PSYCHOGENIC POLYDIPSIA: ICD-10-CM

## 2020-11-12 DIAGNOSIS — F54 PSYCHOGENIC POLYDIPSIA: ICD-10-CM

## 2020-11-12 DIAGNOSIS — Z12.5 SCREENING FOR PROSTATE CANCER: ICD-10-CM

## 2020-11-12 LAB
ALBUMIN SERPL BCP-MCNC: 4 G/DL (ref 3.5–5.2)
ALP SERPL-CCNC: 99 U/L (ref 55–135)
ALT SERPL W/O P-5'-P-CCNC: 25 U/L (ref 10–44)
ANION GAP SERPL CALC-SCNC: 8 MMOL/L (ref 8–16)
AST SERPL-CCNC: 34 U/L (ref 10–40)
BILIRUB SERPL-MCNC: 0.5 MG/DL (ref 0.1–1)
BUN SERPL-MCNC: 20 MG/DL (ref 6–20)
CALCIUM SERPL-MCNC: 9.5 MG/DL (ref 8.7–10.5)
CHLORIDE SERPL-SCNC: 99 MMOL/L (ref 95–110)
CO2 SERPL-SCNC: 31 MMOL/L (ref 23–29)
COMPLEXED PSA SERPL-MCNC: 0.84 NG/ML (ref 0–4)
CREAT SERPL-MCNC: 0.9 MG/DL (ref 0.5–1.4)
EST. GFR  (AFRICAN AMERICAN): >60 ML/MIN/1.73 M^2
EST. GFR  (NON AFRICAN AMERICAN): >60 ML/MIN/1.73 M^2
GLUCOSE SERPL-MCNC: 102 MG/DL (ref 70–110)
POTASSIUM SERPL-SCNC: 4.4 MMOL/L (ref 3.5–5.1)
PROT SERPL-MCNC: 7.7 G/DL (ref 6–8.4)
SODIUM SERPL-SCNC: 138 MMOL/L (ref 136–145)

## 2020-11-12 PROCEDURE — 36415 COLL VENOUS BLD VENIPUNCTURE: CPT

## 2020-11-12 PROCEDURE — 80053 COMPREHEN METABOLIC PANEL: CPT

## 2020-11-12 PROCEDURE — 84153 ASSAY OF PSA TOTAL: CPT

## 2021-01-04 ENCOUNTER — PATIENT MESSAGE (OUTPATIENT)
Dept: ADMINISTRATIVE | Facility: HOSPITAL | Age: 51
End: 2021-01-04

## 2021-03-03 ENCOUNTER — PATIENT MESSAGE (OUTPATIENT)
Dept: PSYCHIATRY | Facility: CLINIC | Age: 51
End: 2021-03-03

## 2021-03-08 ENCOUNTER — OFFICE VISIT (OUTPATIENT)
Dept: PSYCHIATRY | Facility: CLINIC | Age: 51
End: 2021-03-08
Payer: COMMERCIAL

## 2021-03-08 DIAGNOSIS — F32.A DEPRESSION, UNSPECIFIED DEPRESSION TYPE: ICD-10-CM

## 2021-03-08 DIAGNOSIS — F41.1 GENERALIZED ANXIETY DISORDER: Primary | ICD-10-CM

## 2021-03-08 DIAGNOSIS — R46.81 OBSESSIVE BEHAVIOR: ICD-10-CM

## 2021-03-08 PROCEDURE — 90792 PR PSYCHIATRIC DIAGNOSTIC EVALUATION W/MEDICAL SERVICES: ICD-10-PCS | Mod: 95,,, | Performed by: NURSE PRACTITIONER

## 2021-03-08 PROCEDURE — 90792 PSYCH DIAG EVAL W/MED SRVCS: CPT | Mod: 95,,, | Performed by: NURSE PRACTITIONER

## 2021-03-08 RX ORDER — BUSPIRONE HYDROCHLORIDE 7.5 MG/1
TABLET ORAL
Qty: 60 TABLET | Refills: 0 | Status: SHIPPED | OUTPATIENT
Start: 2021-03-08 | End: 2021-07-09 | Stop reason: SDUPTHER

## 2021-03-08 RX ORDER — HYDROXYZINE HYDROCHLORIDE 25 MG/1
TABLET, FILM COATED ORAL
Qty: 90 TABLET | Refills: 0 | Status: SHIPPED | OUTPATIENT
Start: 2021-03-08 | End: 2021-08-05

## 2021-04-05 ENCOUNTER — PATIENT MESSAGE (OUTPATIENT)
Dept: ADMINISTRATIVE | Facility: HOSPITAL | Age: 51
End: 2021-04-05

## 2021-04-16 ENCOUNTER — PATIENT MESSAGE (OUTPATIENT)
Dept: RESEARCH | Facility: HOSPITAL | Age: 51
End: 2021-04-16

## 2021-06-02 ENCOUNTER — PATIENT MESSAGE (OUTPATIENT)
Dept: ADMINISTRATIVE | Facility: HOSPITAL | Age: 51
End: 2021-06-02

## 2021-06-29 ENCOUNTER — PATIENT MESSAGE (OUTPATIENT)
Dept: PSYCHIATRY | Facility: CLINIC | Age: 51
End: 2021-06-29

## 2021-07-06 ENCOUNTER — PATIENT MESSAGE (OUTPATIENT)
Dept: ADMINISTRATIVE | Facility: HOSPITAL | Age: 51
End: 2021-07-06

## 2021-07-08 ENCOUNTER — PATIENT MESSAGE (OUTPATIENT)
Dept: PSYCHIATRY | Facility: CLINIC | Age: 51
End: 2021-07-08

## 2021-07-09 ENCOUNTER — OFFICE VISIT (OUTPATIENT)
Dept: PSYCHIATRY | Facility: CLINIC | Age: 51
End: 2021-07-09
Payer: COMMERCIAL

## 2021-07-09 DIAGNOSIS — R46.81 OBSESSIVE BEHAVIOR: ICD-10-CM

## 2021-07-09 DIAGNOSIS — F41.1 GENERALIZED ANXIETY DISORDER: ICD-10-CM

## 2021-07-09 DIAGNOSIS — F32.A DEPRESSION, UNSPECIFIED DEPRESSION TYPE: Primary | ICD-10-CM

## 2021-07-09 PROCEDURE — 99214 PR OFFICE/OUTPT VISIT, EST, LEVL IV, 30-39 MIN: ICD-10-PCS | Mod: 95,,, | Performed by: NURSE PRACTITIONER

## 2021-07-09 PROCEDURE — 99214 OFFICE O/P EST MOD 30 MIN: CPT | Mod: 95,,, | Performed by: NURSE PRACTITIONER

## 2021-07-09 RX ORDER — BUSPIRONE HYDROCHLORIDE 15 MG/1
15 TABLET ORAL 2 TIMES DAILY
Qty: 60 TABLET | Refills: 2 | Status: SHIPPED | OUTPATIENT
Start: 2021-07-09 | End: 2021-10-19

## 2021-07-28 ENCOUNTER — TELEPHONE (OUTPATIENT)
Dept: PSYCHIATRY | Facility: CLINIC | Age: 51
End: 2021-07-28

## 2021-07-30 ENCOUNTER — PATIENT MESSAGE (OUTPATIENT)
Dept: PSYCHIATRY | Facility: CLINIC | Age: 51
End: 2021-07-30

## 2021-09-24 ENCOUNTER — PATIENT MESSAGE (OUTPATIENT)
Dept: INTERNAL MEDICINE | Facility: CLINIC | Age: 51
End: 2021-09-24

## 2021-10-01 ENCOUNTER — PATIENT MESSAGE (OUTPATIENT)
Dept: PODIATRY | Facility: CLINIC | Age: 51
End: 2021-10-01

## 2021-10-01 ENCOUNTER — TELEPHONE (OUTPATIENT)
Dept: PODIATRY | Facility: CLINIC | Age: 51
End: 2021-10-01

## 2021-10-01 ENCOUNTER — OFFICE VISIT (OUTPATIENT)
Dept: PODIATRY | Facility: CLINIC | Age: 51
End: 2021-10-01
Payer: COMMERCIAL

## 2021-10-01 VITALS
HEART RATE: 58 BPM | BODY MASS INDEX: 29.5 KG/M2 | DIASTOLIC BLOOD PRESSURE: 80 MMHG | WEIGHT: 194 LBS | SYSTOLIC BLOOD PRESSURE: 120 MMHG

## 2021-10-01 DIAGNOSIS — M72.2 PLANTAR FASCIITIS, BILATERAL: Primary | ICD-10-CM

## 2021-10-01 DIAGNOSIS — M79.672 HEEL PAIN, BILATERAL: ICD-10-CM

## 2021-10-01 DIAGNOSIS — M79.671 HEEL PAIN, BILATERAL: ICD-10-CM

## 2021-10-01 PROCEDURE — 1159F MED LIST DOCD IN RCRD: CPT | Mod: CPTII,S$GLB,, | Performed by: PODIATRIST

## 2021-10-01 PROCEDURE — 99999 PR PBB SHADOW E&M-EST. PATIENT-LVL III: ICD-10-PCS | Mod: PBBFAC,,, | Performed by: PODIATRIST

## 2021-10-01 PROCEDURE — 3074F PR MOST RECENT SYSTOLIC BLOOD PRESSURE < 130 MM HG: ICD-10-PCS | Mod: CPTII,S$GLB,, | Performed by: PODIATRIST

## 2021-10-01 PROCEDURE — 3079F DIAST BP 80-89 MM HG: CPT | Mod: CPTII,S$GLB,, | Performed by: PODIATRIST

## 2021-10-01 PROCEDURE — 1160F PR REVIEW ALL MEDS BY PRESCRIBER/CLIN PHARMACIST DOCUMENTED: ICD-10-PCS | Mod: CPTII,S$GLB,, | Performed by: PODIATRIST

## 2021-10-01 PROCEDURE — 3008F PR BODY MASS INDEX (BMI) DOCUMENTED: ICD-10-PCS | Mod: CPTII,S$GLB,, | Performed by: PODIATRIST

## 2021-10-01 PROCEDURE — 3074F SYST BP LT 130 MM HG: CPT | Mod: CPTII,S$GLB,, | Performed by: PODIATRIST

## 2021-10-01 PROCEDURE — 1159F PR MEDICATION LIST DOCUMENTED IN MEDICAL RECORD: ICD-10-PCS | Mod: CPTII,S$GLB,, | Performed by: PODIATRIST

## 2021-10-01 PROCEDURE — 3008F BODY MASS INDEX DOCD: CPT | Mod: CPTII,S$GLB,, | Performed by: PODIATRIST

## 2021-10-01 PROCEDURE — 99999 PR PBB SHADOW E&M-EST. PATIENT-LVL III: CPT | Mod: PBBFAC,,, | Performed by: PODIATRIST

## 2021-10-01 PROCEDURE — 3079F PR MOST RECENT DIASTOLIC BLOOD PRESSURE 80-89 MM HG: ICD-10-PCS | Mod: CPTII,S$GLB,, | Performed by: PODIATRIST

## 2021-10-01 PROCEDURE — 99203 OFFICE O/P NEW LOW 30 MIN: CPT | Mod: S$GLB,,, | Performed by: PODIATRIST

## 2021-10-01 PROCEDURE — 99203 PR OFFICE/OUTPT VISIT, NEW, LEVL III, 30-44 MIN: ICD-10-PCS | Mod: S$GLB,,, | Performed by: PODIATRIST

## 2021-10-01 PROCEDURE — 1160F RVW MEDS BY RX/DR IN RCRD: CPT | Mod: CPTII,S$GLB,, | Performed by: PODIATRIST

## 2021-10-01 RX ORDER — METHYLPREDNISOLONE 4 MG/1
TABLET ORAL
Qty: 1 PACKAGE | Refills: 0 | Status: SHIPPED | OUTPATIENT
Start: 2021-10-01 | End: 2021-10-25

## 2021-10-04 ENCOUNTER — PATIENT MESSAGE (OUTPATIENT)
Dept: ADMINISTRATIVE | Facility: HOSPITAL | Age: 51
End: 2021-10-04

## 2021-10-04 ENCOUNTER — PATIENT MESSAGE (OUTPATIENT)
Dept: PODIATRY | Facility: CLINIC | Age: 51
End: 2021-10-04

## 2021-10-08 ENCOUNTER — TELEPHONE (OUTPATIENT)
Dept: PODIATRY | Facility: CLINIC | Age: 51
End: 2021-10-08

## 2021-10-25 ENCOUNTER — OFFICE VISIT (OUTPATIENT)
Dept: INTERNAL MEDICINE | Facility: CLINIC | Age: 51
End: 2021-10-25
Payer: COMMERCIAL

## 2021-10-25 DIAGNOSIS — R05.9 COUGH: ICD-10-CM

## 2021-10-25 DIAGNOSIS — J01.41 ACUTE RECURRENT PANSINUSITIS: Primary | ICD-10-CM

## 2021-10-25 PROCEDURE — 1159F PR MEDICATION LIST DOCUMENTED IN MEDICAL RECORD: ICD-10-PCS | Mod: CPTII,95,, | Performed by: INTERNAL MEDICINE

## 2021-10-25 PROCEDURE — 99213 PR OFFICE/OUTPT VISIT, EST, LEVL III, 20-29 MIN: ICD-10-PCS | Mod: 95,,, | Performed by: INTERNAL MEDICINE

## 2021-10-25 PROCEDURE — 1160F RVW MEDS BY RX/DR IN RCRD: CPT | Mod: CPTII,95,, | Performed by: INTERNAL MEDICINE

## 2021-10-25 PROCEDURE — 1160F PR REVIEW ALL MEDS BY PRESCRIBER/CLIN PHARMACIST DOCUMENTED: ICD-10-PCS | Mod: CPTII,95,, | Performed by: INTERNAL MEDICINE

## 2021-10-25 PROCEDURE — 1159F MED LIST DOCD IN RCRD: CPT | Mod: CPTII,95,, | Performed by: INTERNAL MEDICINE

## 2021-10-25 PROCEDURE — 99213 OFFICE O/P EST LOW 20 MIN: CPT | Mod: 95,,, | Performed by: INTERNAL MEDICINE

## 2021-10-25 RX ORDER — AMOXICILLIN AND CLAVULANATE POTASSIUM 875; 125 MG/1; MG/1
1 TABLET, FILM COATED ORAL 2 TIMES DAILY
Qty: 20 TABLET | Refills: 0 | Status: SHIPPED | OUTPATIENT
Start: 2021-10-25 | End: 2021-11-04

## 2021-10-25 RX ORDER — METHYLPREDNISOLONE 4 MG/1
TABLET ORAL
Qty: 21 EACH | Refills: 0 | Status: SHIPPED | OUTPATIENT
Start: 2021-10-25 | End: 2021-11-15

## 2021-10-25 RX ORDER — PROMETHAZINE HYDROCHLORIDE AND DEXTROMETHORPHAN HYDROBROMIDE 6.25; 15 MG/5ML; MG/5ML
5 SYRUP ORAL EVERY 6 HOURS PRN
Qty: 300 ML | Refills: 0 | Status: SHIPPED | OUTPATIENT
Start: 2021-10-25 | End: 2021-11-04

## 2021-11-30 ENCOUNTER — PATIENT MESSAGE (OUTPATIENT)
Dept: INTERNAL MEDICINE | Facility: CLINIC | Age: 51
End: 2021-11-30
Payer: COMMERCIAL

## 2021-11-30 DIAGNOSIS — Z12.11 SCREENING FOR MALIGNANT NEOPLASM OF COLON: Primary | ICD-10-CM

## 2021-12-02 ENCOUNTER — PATIENT MESSAGE (OUTPATIENT)
Dept: INTERNAL MEDICINE | Facility: CLINIC | Age: 51
End: 2021-12-02
Payer: COMMERCIAL

## 2021-12-03 ENCOUNTER — OFFICE VISIT (OUTPATIENT)
Dept: INTERNAL MEDICINE | Facility: CLINIC | Age: 51
End: 2021-12-03
Payer: COMMERCIAL

## 2021-12-03 ENCOUNTER — LAB VISIT (OUTPATIENT)
Dept: LAB | Facility: HOSPITAL | Age: 51
End: 2021-12-03
Attending: INTERNAL MEDICINE
Payer: COMMERCIAL

## 2021-12-03 VITALS
BODY MASS INDEX: 29.3 KG/M2 | HEIGHT: 68 IN | HEART RATE: 63 BPM | WEIGHT: 193.31 LBS | SYSTOLIC BLOOD PRESSURE: 132 MMHG | DIASTOLIC BLOOD PRESSURE: 80 MMHG | OXYGEN SATURATION: 99 %

## 2021-12-03 DIAGNOSIS — R35.1 NOCTURIA: ICD-10-CM

## 2021-12-03 DIAGNOSIS — Z00.00 ROUTINE HISTORY AND PHYSICAL EXAMINATION OF ADULT: ICD-10-CM

## 2021-12-03 DIAGNOSIS — R06.5 MOUTH BREATHING: ICD-10-CM

## 2021-12-03 DIAGNOSIS — G47.00 INSOMNIA, UNSPECIFIED TYPE: ICD-10-CM

## 2021-12-03 DIAGNOSIS — Z00.00 ROUTINE HISTORY AND PHYSICAL EXAMINATION OF ADULT: Primary | ICD-10-CM

## 2021-12-03 DIAGNOSIS — R39.11 HESITANCY: ICD-10-CM

## 2021-12-03 DIAGNOSIS — R00.1 BRADYCARDIA: ICD-10-CM

## 2021-12-03 DIAGNOSIS — Z12.5 SCREENING FOR PROSTATE CANCER: ICD-10-CM

## 2021-12-03 LAB
ALBUMIN SERPL BCP-MCNC: 3.9 G/DL (ref 3.5–5.2)
ALP SERPL-CCNC: 90 U/L (ref 55–135)
ALT SERPL W/O P-5'-P-CCNC: 27 U/L (ref 10–44)
ANION GAP SERPL CALC-SCNC: 7 MMOL/L (ref 8–16)
AST SERPL-CCNC: 34 U/L (ref 10–40)
BILIRUB SERPL-MCNC: 0.6 MG/DL (ref 0.1–1)
BUN SERPL-MCNC: 24 MG/DL (ref 6–20)
CALCIUM SERPL-MCNC: 9.8 MG/DL (ref 8.7–10.5)
CHLORIDE SERPL-SCNC: 101 MMOL/L (ref 95–110)
CHOLEST SERPL-MCNC: 179 MG/DL (ref 120–199)
CHOLEST/HDLC SERPL: 3.3 {RATIO} (ref 2–5)
CO2 SERPL-SCNC: 29 MMOL/L (ref 23–29)
COMPLEXED PSA SERPL-MCNC: 0.74 NG/ML (ref 0–4)
CREAT SERPL-MCNC: 0.8 MG/DL (ref 0.5–1.4)
EST. GFR  (AFRICAN AMERICAN): >60 ML/MIN/1.73 M^2
EST. GFR  (NON AFRICAN AMERICAN): >60 ML/MIN/1.73 M^2
ESTIMATED AVG GLUCOSE: 111 MG/DL (ref 68–131)
GLUCOSE SERPL-MCNC: 86 MG/DL (ref 70–110)
HBA1C MFR BLD: 5.5 % (ref 4–5.6)
HDLC SERPL-MCNC: 54 MG/DL (ref 40–75)
HDLC SERPL: 30.2 % (ref 20–50)
LDLC SERPL CALC-MCNC: 104.4 MG/DL (ref 63–159)
NONHDLC SERPL-MCNC: 125 MG/DL
POTASSIUM SERPL-SCNC: 4.4 MMOL/L (ref 3.5–5.1)
PROT SERPL-MCNC: 7.5 G/DL (ref 6–8.4)
SODIUM SERPL-SCNC: 137 MMOL/L (ref 136–145)
TRIGL SERPL-MCNC: 103 MG/DL (ref 30–150)

## 2021-12-03 PROCEDURE — 36415 COLL VENOUS BLD VENIPUNCTURE: CPT | Performed by: INTERNAL MEDICINE

## 2021-12-03 PROCEDURE — 84153 ASSAY OF PSA TOTAL: CPT | Performed by: INTERNAL MEDICINE

## 2021-12-03 PROCEDURE — 99396 PREV VISIT EST AGE 40-64: CPT | Mod: S$GLB,,, | Performed by: INTERNAL MEDICINE

## 2021-12-03 PROCEDURE — 99396 PR PREVENTIVE VISIT,EST,40-64: ICD-10-PCS | Mod: S$GLB,,, | Performed by: INTERNAL MEDICINE

## 2021-12-03 PROCEDURE — 99999 PR PBB SHADOW E&M-EST. PATIENT-LVL IV: CPT | Mod: PBBFAC,,, | Performed by: INTERNAL MEDICINE

## 2021-12-03 PROCEDURE — 83036 HEMOGLOBIN GLYCOSYLATED A1C: CPT | Performed by: INTERNAL MEDICINE

## 2021-12-03 PROCEDURE — 81001 URINALYSIS AUTO W/SCOPE: CPT | Performed by: INTERNAL MEDICINE

## 2021-12-03 PROCEDURE — 99999 PR PBB SHADOW E&M-EST. PATIENT-LVL IV: ICD-10-PCS | Mod: PBBFAC,,, | Performed by: INTERNAL MEDICINE

## 2021-12-03 PROCEDURE — 80053 COMPREHEN METABOLIC PANEL: CPT | Performed by: INTERNAL MEDICINE

## 2021-12-03 PROCEDURE — 80061 LIPID PANEL: CPT | Performed by: INTERNAL MEDICINE

## 2021-12-03 RX ORDER — ZOLPIDEM TARTRATE 5 MG/1
5 TABLET ORAL NIGHTLY PRN
Qty: 4 TABLET | Refills: 0 | Status: SHIPPED | OUTPATIENT
Start: 2021-12-03 | End: 2022-06-21

## 2021-12-04 LAB
BACTERIA #/AREA URNS AUTO: NORMAL /HPF
BILIRUB UR QL STRIP: NEGATIVE
CLARITY UR REFRACT.AUTO: CLEAR
COLOR UR AUTO: YELLOW
GLUCOSE UR QL STRIP: NEGATIVE
HGB UR QL STRIP: NEGATIVE
KETONES UR QL STRIP: NEGATIVE
LEUKOCYTE ESTERASE UR QL STRIP: NEGATIVE
MICROSCOPIC COMMENT: NORMAL
NITRITE UR QL STRIP: NEGATIVE
PH UR STRIP: 8 [PH] (ref 5–8)
PROT UR QL STRIP: NEGATIVE
RBC #/AREA URNS AUTO: 1 /HPF (ref 0–4)
SP GR UR STRIP: 1.01 (ref 1–1.03)
URN SPEC COLLECT METH UR: NORMAL

## 2021-12-21 DIAGNOSIS — F41.9 ANXIETY: ICD-10-CM

## 2021-12-29 ENCOUNTER — TELEPHONE (OUTPATIENT)
Dept: INTERNAL MEDICINE | Facility: CLINIC | Age: 51
End: 2021-12-29
Payer: COMMERCIAL

## 2021-12-29 RX ORDER — SERTRALINE HYDROCHLORIDE 50 MG/1
TABLET, FILM COATED ORAL
Qty: 135 TABLET | Refills: 3 | Status: SHIPPED | OUTPATIENT
Start: 2021-12-29 | End: 2022-06-21 | Stop reason: SDUPTHER

## 2021-12-29 NOTE — TELEPHONE ENCOUNTER
----- Message from Idalmis Crespo sent at 12/29/2021  9:28 AM CST -----  Requesting an RX refill or new RX.  Is this a refill or new RX: refill  RX name and strength (copy/paste from chart): sertraline (ZOLOFT) 50 MG tablet  Is this a 30 day or 90 day RX: 90 day   Patient advised that in the future they can use their [facility] account to request a refill?:  done   Pharmacy name and phone # (copy/paste from chart):      [id number] - [place] - [address] [place] [address] AT [facility]  [address] [facility] [place] [address]  Phone: [phone] Fax: [phone]      Comments:

## 2022-01-18 ENCOUNTER — PATIENT MESSAGE (OUTPATIENT)
Dept: ADMINISTRATIVE | Facility: HOSPITAL | Age: 52
End: 2022-01-18
Payer: COMMERCIAL

## 2022-05-17 ENCOUNTER — PATIENT MESSAGE (OUTPATIENT)
Dept: ENDOSCOPY | Facility: HOSPITAL | Age: 52
End: 2022-05-17
Payer: COMMERCIAL

## 2022-05-30 ENCOUNTER — PATIENT MESSAGE (OUTPATIENT)
Dept: ADMINISTRATIVE | Facility: HOSPITAL | Age: 52
End: 2022-05-30
Payer: COMMERCIAL

## 2022-06-20 ENCOUNTER — PATIENT MESSAGE (OUTPATIENT)
Dept: INTERNAL MEDICINE | Facility: CLINIC | Age: 52
End: 2022-06-20
Payer: COMMERCIAL

## 2022-06-21 ENCOUNTER — OFFICE VISIT (OUTPATIENT)
Dept: INTERNAL MEDICINE | Facility: CLINIC | Age: 52
End: 2022-06-21
Payer: COMMERCIAL

## 2022-06-21 ENCOUNTER — LAB VISIT (OUTPATIENT)
Dept: LAB | Facility: HOSPITAL | Age: 52
End: 2022-06-21
Attending: INTERNAL MEDICINE
Payer: COMMERCIAL

## 2022-06-21 VITALS
DIASTOLIC BLOOD PRESSURE: 78 MMHG | WEIGHT: 200 LBS | SYSTOLIC BLOOD PRESSURE: 110 MMHG | HEIGHT: 68 IN | BODY MASS INDEX: 30.31 KG/M2

## 2022-06-21 DIAGNOSIS — Z00.00 ROUTINE HISTORY AND PHYSICAL EXAMINATION OF ADULT: Primary | ICD-10-CM

## 2022-06-21 DIAGNOSIS — Z12.5 SCREENING FOR PROSTATE CANCER: ICD-10-CM

## 2022-06-21 DIAGNOSIS — G47.00 INSOMNIA, UNSPECIFIED TYPE: ICD-10-CM

## 2022-06-21 DIAGNOSIS — N32.89 BLADDER SPASM: ICD-10-CM

## 2022-06-21 DIAGNOSIS — Z00.00 ROUTINE HISTORY AND PHYSICAL EXAMINATION OF ADULT: ICD-10-CM

## 2022-06-21 DIAGNOSIS — R46.81 OBSESSIVE-COMPULSIVE BEHAVIOR: ICD-10-CM

## 2022-06-21 DIAGNOSIS — K21.9 GASTROESOPHAGEAL REFLUX DISEASE, UNSPECIFIED WHETHER ESOPHAGITIS PRESENT: ICD-10-CM

## 2022-06-21 LAB
ALBUMIN SERPL BCP-MCNC: 4 G/DL (ref 3.5–5.2)
ALP SERPL-CCNC: 81 U/L (ref 55–135)
ALT SERPL W/O P-5'-P-CCNC: 25 U/L (ref 10–44)
ANION GAP SERPL CALC-SCNC: 11 MMOL/L (ref 8–16)
AST SERPL-CCNC: 36 U/L (ref 10–40)
BILIRUB SERPL-MCNC: 0.6 MG/DL (ref 0.1–1)
BILIRUB UR QL STRIP: NEGATIVE
BUN SERPL-MCNC: 18 MG/DL (ref 6–20)
CALCIUM SERPL-MCNC: 9.6 MG/DL (ref 8.7–10.5)
CHLORIDE SERPL-SCNC: 101 MMOL/L (ref 95–110)
CHOLEST SERPL-MCNC: 187 MG/DL (ref 120–199)
CHOLEST/HDLC SERPL: 3.7 {RATIO} (ref 2–5)
CLARITY UR REFRACT.AUTO: CLEAR
CO2 SERPL-SCNC: 26 MMOL/L (ref 23–29)
COLOR UR AUTO: YELLOW
COMPLEXED PSA SERPL-MCNC: 0.61 NG/ML (ref 0–4)
CREAT SERPL-MCNC: 0.9 MG/DL (ref 0.5–1.4)
EST. GFR  (AFRICAN AMERICAN): >60 ML/MIN/1.73 M^2
EST. GFR  (NON AFRICAN AMERICAN): >60 ML/MIN/1.73 M^2
GLUCOSE SERPL-MCNC: 90 MG/DL (ref 70–110)
GLUCOSE UR QL STRIP: NEGATIVE
HDLC SERPL-MCNC: 51 MG/DL (ref 40–75)
HDLC SERPL: 27.3 % (ref 20–50)
HGB UR QL STRIP: NEGATIVE
KETONES UR QL STRIP: NEGATIVE
LDLC SERPL CALC-MCNC: 114 MG/DL (ref 63–159)
LEUKOCYTE ESTERASE UR QL STRIP: NEGATIVE
NITRITE UR QL STRIP: NEGATIVE
NONHDLC SERPL-MCNC: 136 MG/DL
PH UR STRIP: 8 [PH] (ref 5–8)
POTASSIUM SERPL-SCNC: 4 MMOL/L (ref 3.5–5.1)
PROT SERPL-MCNC: 7.4 G/DL (ref 6–8.4)
PROT UR QL STRIP: NEGATIVE
SODIUM SERPL-SCNC: 138 MMOL/L (ref 136–145)
SP GR UR STRIP: 1.01 (ref 1–1.03)
TRIGL SERPL-MCNC: 110 MG/DL (ref 30–150)
URN SPEC COLLECT METH UR: NORMAL

## 2022-06-21 PROCEDURE — 99999 PR PBB SHADOW E&M-EST. PATIENT-LVL III: ICD-10-PCS | Mod: PBBFAC,,, | Performed by: INTERNAL MEDICINE

## 2022-06-21 PROCEDURE — 36415 COLL VENOUS BLD VENIPUNCTURE: CPT | Performed by: INTERNAL MEDICINE

## 2022-06-21 PROCEDURE — 99396 PREV VISIT EST AGE 40-64: CPT | Mod: S$GLB,,, | Performed by: INTERNAL MEDICINE

## 2022-06-21 PROCEDURE — 1160F PR REVIEW ALL MEDS BY PRESCRIBER/CLIN PHARMACIST DOCUMENTED: ICD-10-PCS | Mod: CPTII,S$GLB,, | Performed by: INTERNAL MEDICINE

## 2022-06-21 PROCEDURE — 3008F BODY MASS INDEX DOCD: CPT | Mod: CPTII,S$GLB,, | Performed by: INTERNAL MEDICINE

## 2022-06-21 PROCEDURE — 1159F PR MEDICATION LIST DOCUMENTED IN MEDICAL RECORD: ICD-10-PCS | Mod: CPTII,S$GLB,, | Performed by: INTERNAL MEDICINE

## 2022-06-21 PROCEDURE — 3078F DIAST BP <80 MM HG: CPT | Mod: CPTII,S$GLB,, | Performed by: INTERNAL MEDICINE

## 2022-06-21 PROCEDURE — 3078F PR MOST RECENT DIASTOLIC BLOOD PRESSURE < 80 MM HG: ICD-10-PCS | Mod: CPTII,S$GLB,, | Performed by: INTERNAL MEDICINE

## 2022-06-21 PROCEDURE — 1160F RVW MEDS BY RX/DR IN RCRD: CPT | Mod: CPTII,S$GLB,, | Performed by: INTERNAL MEDICINE

## 2022-06-21 PROCEDURE — 80053 COMPREHEN METABOLIC PANEL: CPT | Performed by: INTERNAL MEDICINE

## 2022-06-21 PROCEDURE — 3074F SYST BP LT 130 MM HG: CPT | Mod: CPTII,S$GLB,, | Performed by: INTERNAL MEDICINE

## 2022-06-21 PROCEDURE — 3074F PR MOST RECENT SYSTOLIC BLOOD PRESSURE < 130 MM HG: ICD-10-PCS | Mod: CPTII,S$GLB,, | Performed by: INTERNAL MEDICINE

## 2022-06-21 PROCEDURE — 3008F PR BODY MASS INDEX (BMI) DOCUMENTED: ICD-10-PCS | Mod: CPTII,S$GLB,, | Performed by: INTERNAL MEDICINE

## 2022-06-21 PROCEDURE — 99396 PR PREVENTIVE VISIT,EST,40-64: ICD-10-PCS | Mod: S$GLB,,, | Performed by: INTERNAL MEDICINE

## 2022-06-21 PROCEDURE — 1159F MED LIST DOCD IN RCRD: CPT | Mod: CPTII,S$GLB,, | Performed by: INTERNAL MEDICINE

## 2022-06-21 PROCEDURE — 99999 PR PBB SHADOW E&M-EST. PATIENT-LVL III: CPT | Mod: PBBFAC,,, | Performed by: INTERNAL MEDICINE

## 2022-06-21 PROCEDURE — 80061 LIPID PANEL: CPT | Performed by: INTERNAL MEDICINE

## 2022-06-21 PROCEDURE — 84153 ASSAY OF PSA TOTAL: CPT | Performed by: INTERNAL MEDICINE

## 2022-06-21 PROCEDURE — 81003 URINALYSIS AUTO W/O SCOPE: CPT | Performed by: INTERNAL MEDICINE

## 2022-06-21 RX ORDER — OXYBUTYNIN CHLORIDE 5 MG/1
5 TABLET, EXTENDED RELEASE ORAL DAILY
Qty: 90 TABLET | Refills: 3 | Status: SHIPPED | OUTPATIENT
Start: 2022-06-21 | End: 2022-06-21 | Stop reason: SDUPTHER

## 2022-06-21 RX ORDER — SERTRALINE HYDROCHLORIDE 100 MG/1
100 TABLET, FILM COATED ORAL NIGHTLY
Qty: 90 TABLET | Refills: 3 | Status: SHIPPED | OUTPATIENT
Start: 2022-06-21 | End: 2023-08-11

## 2022-06-21 RX ORDER — SERTRALINE HYDROCHLORIDE 100 MG/1
100 TABLET, FILM COATED ORAL NIGHTLY
Qty: 90 TABLET | Refills: 3 | Status: SHIPPED | OUTPATIENT
Start: 2022-06-21 | End: 2022-06-21 | Stop reason: SDUPTHER

## 2022-06-21 RX ORDER — OXYBUTYNIN CHLORIDE 5 MG/1
5 TABLET, EXTENDED RELEASE ORAL DAILY
Qty: 90 TABLET | Refills: 3 | Status: SHIPPED | OUTPATIENT
Start: 2022-06-21 | End: 2023-07-03

## 2022-06-21 RX ORDER — HYDROXYZINE HYDROCHLORIDE 25 MG/1
25 TABLET, FILM COATED ORAL NIGHTLY PRN
Qty: 60 TABLET | Refills: 3 | Status: SHIPPED | OUTPATIENT
Start: 2022-06-21 | End: 2023-02-09

## 2022-06-21 RX ORDER — HYDROXYZINE HYDROCHLORIDE 25 MG/1
25 TABLET, FILM COATED ORAL NIGHTLY PRN
Qty: 60 TABLET | Refills: 3 | Status: SHIPPED | OUTPATIENT
Start: 2022-06-21 | End: 2022-06-21 | Stop reason: SDUPTHER

## 2022-06-21 NOTE — TELEPHONE ENCOUNTER
----- Message from Alvin Mckeon sent at 6/21/2022 10:40 AM CDT -----  Contact: Gisella Del Cid [phone]  Requesting an RX refill or new RX.  Is this a refill or new RX: Refill  RX name and strength hydrOXYzine HCL (ATARAX) 25 MG tablet,  oxybutynin (DITROPAN-XL) 5 MG TR24, and sertraline (ZOLOFT) 100 MG tablet  Is this a 30 day or 90 day RX:   Pharmacy name and phone # [id number] - [place] - [address] [facility] AT [facility]  Phone:  [phone] Fax:  [phone]    Comments: Although, our records show that the elctronic transmission went thru, the pharmacy said the computer crashed and when it came back up all he could see is the doctor's name.

## 2022-06-21 NOTE — PROGRESS NOTES
Subjective:       Patient ID: Robby Maya is a 51 y.o. male.    Chief Complaint:   Annual Exam    HPI - Mr. Maya has a few c/o today.  Some worsening of GERD.  Still with bladder irritability - nocturia x4-5; strains to empty.  OCD is better with 100 mg zoloft and some CBT.  He has gained 25 lbs in past couple of years - will try to drop 10.  He is not a smoker.  He has had both covid boosters    PMH:  Anxiety  Obsessive-Compulsive behaviors  Psychogenic Polydipsia, controlled  GERD  Bladder spasms     Meds: Reviewed and reconciled in EPIC with patient during visit today.     Review of Systems   Constitutional: Negative for fever.   HENT: Negative for congestion.    Respiratory: Negative for shortness of breath.    Cardiovascular: Negative for chest pain.   Gastrointestinal: Positive for abdominal pain.   Genitourinary: Positive for difficulty urinating and frequency.   Musculoskeletal: Negative for arthralgias.   Skin: Negative for rash.   Neurological: Negative for headaches.   Psychiatric/Behavioral: Positive for sleep disturbance.       Objective:      Physical Exam  Vitals reviewed.   Constitutional:       General: He is not in acute distress.     Appearance: He is well-developed. He is obese. He is not diaphoretic.   HENT:      Head: Normocephalic and atraumatic.   Cardiovascular:      Rate and Rhythm: Normal rate and regular rhythm.      Heart sounds: Normal heart sounds. No murmur heard.    No friction rub. No gallop.   Pulmonary:      Effort: No respiratory distress.      Breath sounds: No wheezing or rales.   Chest:      Chest wall: No tenderness.   Skin:     General: Skin is warm.      Findings: No erythema.   Neurological:      Mental Status: He is alert and oriented to person, place, and time.   Psychiatric:         Thought Content: Thought content normal.         Assessment:       1. Routine history and physical examination of adult    2. Obsessive-compulsive behavior    3. Gastroesophageal  reflux disease, unspecified whether esophagitis present    4. Screening for prostate cancer    5. Bladder spasm        Plan:       Robby was seen today for annual exam.    Diagnoses and all orders for this visit:    Routine history and physical examination of adult - doing well.  Encouraged a little weight loss.  Updating labs  -     Comprehensive metabolic panel; Future  -     Lipid panel; Future  -     PSA, Screening; Future    Obsessive-compulsive behavior - improved.  Refills today  -     hydrOXYzine HCL (ATARAX) 25 MG tablet; Take 1 tablet (25 mg total) by mouth nightly as needed for Anxiety.  -     sertraline (ZOLOFT) 100 MG tablet; Take 1 tablet (100 mg total) by mouth every evening.    Gastroesophageal reflux disease, unspecified whether esophagitis present - a new problem.  Discussed weight loss, dietary modification, elevation of HOB, OTC prilosec    Screening for prostate cancer - with frequency, consider prostate etiology  -     PSA, Screening; Future    Bladder spasm - refills today.  Check for infection  -     oxybutynin (DITROPAN-XL) 5 MG TR24; Take 1 tablet (5 mg total) by mouth once daily.  -     Urinalysis    rtc prn, or in 6 months    JIHAN Velarde MD MPH  Staff Internist

## 2022-06-21 NOTE — TELEPHONE ENCOUNTER
No new care gaps identified.  [facility] Embedded Care Gaps. Reference number: [id number]. 6/21/2022   1:53:42 PM CDT

## 2023-01-16 ENCOUNTER — PATIENT MESSAGE (OUTPATIENT)
Dept: INTERNAL MEDICINE | Facility: CLINIC | Age: 53
End: 2023-01-16
Payer: COMMERCIAL

## 2023-01-17 ENCOUNTER — TELEPHONE (OUTPATIENT)
Dept: INTERNAL MEDICINE | Facility: CLINIC | Age: 53
End: 2023-01-17
Payer: COMMERCIAL

## 2023-01-17 NOTE — TELEPHONE ENCOUNTER
----- Message from Celia Heller sent at 1/17/2023 11:21 AM CST -----  Contact: [phone]  Pt is requesting a call back in regards to request per patient portal message for pt to be seen ! Please f/u

## 2023-01-18 ENCOUNTER — OFFICE VISIT (OUTPATIENT)
Dept: INTERNAL MEDICINE | Facility: CLINIC | Age: 53
End: 2023-01-18
Payer: COMMERCIAL

## 2023-01-18 ENCOUNTER — PATIENT MESSAGE (OUTPATIENT)
Dept: INTERNAL MEDICINE | Facility: CLINIC | Age: 53
End: 2023-01-18

## 2023-01-18 VITALS
SYSTOLIC BLOOD PRESSURE: 120 MMHG | OXYGEN SATURATION: 99 % | BODY MASS INDEX: 30.34 KG/M2 | HEIGHT: 68 IN | HEART RATE: 64 BPM | DIASTOLIC BLOOD PRESSURE: 76 MMHG | TEMPERATURE: 98 F | WEIGHT: 200.19 LBS

## 2023-01-18 DIAGNOSIS — B96.89 BACTERIAL SINUSITIS: Primary | ICD-10-CM

## 2023-01-18 DIAGNOSIS — J32.9 BACTERIAL SINUSITIS: Primary | ICD-10-CM

## 2023-01-18 PROCEDURE — 99999 PR PBB SHADOW E&M-EST. PATIENT-LVL III: ICD-10-PCS | Mod: PBBFAC,,, | Performed by: INTERNAL MEDICINE

## 2023-01-18 PROCEDURE — 99999 PR PBB SHADOW E&M-EST. PATIENT-LVL III: CPT | Mod: PBBFAC,,, | Performed by: INTERNAL MEDICINE

## 2023-01-18 PROCEDURE — 3074F SYST BP LT 130 MM HG: CPT | Mod: CPTII,S$GLB,, | Performed by: INTERNAL MEDICINE

## 2023-01-18 PROCEDURE — 1160F PR REVIEW ALL MEDS BY PRESCRIBER/CLIN PHARMACIST DOCUMENTED: ICD-10-PCS | Mod: CPTII,S$GLB,, | Performed by: INTERNAL MEDICINE

## 2023-01-18 PROCEDURE — 3074F PR MOST RECENT SYSTOLIC BLOOD PRESSURE < 130 MM HG: ICD-10-PCS | Mod: CPTII,S$GLB,, | Performed by: INTERNAL MEDICINE

## 2023-01-18 PROCEDURE — 99213 PR OFFICE/OUTPT VISIT, EST, LEVL III, 20-29 MIN: ICD-10-PCS | Mod: S$GLB,,, | Performed by: INTERNAL MEDICINE

## 2023-01-18 PROCEDURE — 3078F PR MOST RECENT DIASTOLIC BLOOD PRESSURE < 80 MM HG: ICD-10-PCS | Mod: CPTII,S$GLB,, | Performed by: INTERNAL MEDICINE

## 2023-01-18 PROCEDURE — 1159F PR MEDICATION LIST DOCUMENTED IN MEDICAL RECORD: ICD-10-PCS | Mod: CPTII,S$GLB,, | Performed by: INTERNAL MEDICINE

## 2023-01-18 PROCEDURE — 99213 OFFICE O/P EST LOW 20 MIN: CPT | Mod: S$GLB,,, | Performed by: INTERNAL MEDICINE

## 2023-01-18 PROCEDURE — 1159F MED LIST DOCD IN RCRD: CPT | Mod: CPTII,S$GLB,, | Performed by: INTERNAL MEDICINE

## 2023-01-18 PROCEDURE — 3008F PR BODY MASS INDEX (BMI) DOCUMENTED: ICD-10-PCS | Mod: CPTII,S$GLB,, | Performed by: INTERNAL MEDICINE

## 2023-01-18 PROCEDURE — 1160F RVW MEDS BY RX/DR IN RCRD: CPT | Mod: CPTII,S$GLB,, | Performed by: INTERNAL MEDICINE

## 2023-01-18 PROCEDURE — 3078F DIAST BP <80 MM HG: CPT | Mod: CPTII,S$GLB,, | Performed by: INTERNAL MEDICINE

## 2023-01-18 PROCEDURE — 3008F BODY MASS INDEX DOCD: CPT | Mod: CPTII,S$GLB,, | Performed by: INTERNAL MEDICINE

## 2023-01-18 RX ORDER — AZITHROMYCIN 250 MG/1
TABLET, FILM COATED ORAL
Qty: 6 TABLET | Refills: 0 | Status: SHIPPED | OUTPATIENT
Start: 2023-01-18 | End: 2023-01-23

## 2023-01-18 RX ORDER — FLUTICASONE PROPIONATE 50 MCG
2 SPRAY, SUSPENSION (ML) NASAL DAILY
Qty: 16 G | Refills: 1 | Status: SHIPPED | OUTPATIENT
Start: 2023-01-18 | End: 2023-02-17

## 2023-04-03 ENCOUNTER — TELEPHONE (OUTPATIENT)
Dept: INTERNAL MEDICINE | Facility: CLINIC | Age: 53
End: 2023-04-03
Payer: COMMERCIAL

## 2023-04-03 ENCOUNTER — PATIENT MESSAGE (OUTPATIENT)
Dept: INTERNAL MEDICINE | Facility: CLINIC | Age: 53
End: 2023-04-03
Payer: COMMERCIAL

## 2023-04-03 NOTE — TELEPHONE ENCOUNTER
----- Message from Yeimi Soliman sent at 4/3/2023  8:21 AM CDT -----  Contact: Pt Mobile [phone]  Caller is requesting an earlier appointment then we can schedule.  Caller is requesting a message be sent to the provider.  If this is for urgent care symptoms, did you offer other providers at this location, providers at other locations, or [facility]? (yes, no, n/a):  N/A  If this is for the patients physical, did you offer to schedule next available and put on wait list, or to see NP or PA for their physical?  (yes, no, n/a):  N/A  When is the next available appointment with their provider:  06/22/2023.  Reason for the appointment:  Patient would like to schedule a physical appointment.  Patient preference of timeframe to be scheduled:  04/06/2023.  Would the patient like a call back, or a response through their [facility] portal?:   Call

## 2023-04-04 ENCOUNTER — PATIENT MESSAGE (OUTPATIENT)
Dept: INTERNAL MEDICINE | Facility: CLINIC | Age: 53
End: 2023-04-04
Payer: COMMERCIAL

## 2023-07-03 ENCOUNTER — OFFICE VISIT (OUTPATIENT)
Dept: INTERNAL MEDICINE | Facility: CLINIC | Age: 53
End: 2023-07-03
Payer: COMMERCIAL

## 2023-07-03 ENCOUNTER — LAB VISIT (OUTPATIENT)
Dept: LAB | Facility: HOSPITAL | Age: 53
End: 2023-07-03
Attending: INTERNAL MEDICINE
Payer: COMMERCIAL

## 2023-07-03 VITALS
BODY MASS INDEX: 31.14 KG/M2 | SYSTOLIC BLOOD PRESSURE: 126 MMHG | DIASTOLIC BLOOD PRESSURE: 78 MMHG | HEIGHT: 68 IN | HEART RATE: 70 BPM | OXYGEN SATURATION: 99 % | WEIGHT: 205.5 LBS

## 2023-07-03 DIAGNOSIS — Z00.00 ROUTINE HISTORY AND PHYSICAL EXAMINATION OF ADULT: Primary | ICD-10-CM

## 2023-07-03 DIAGNOSIS — Z12.5 SCREENING FOR PROSTATE CANCER: ICD-10-CM

## 2023-07-03 DIAGNOSIS — Z00.00 ROUTINE HISTORY AND PHYSICAL EXAMINATION OF ADULT: ICD-10-CM

## 2023-07-03 DIAGNOSIS — R46.81 OBSESSIVE-COMPULSIVE BEHAVIOR: ICD-10-CM

## 2023-07-03 LAB
ALBUMIN SERPL BCP-MCNC: 4 G/DL (ref 3.5–5.2)
ALP SERPL-CCNC: 94 U/L (ref 55–135)
ALT SERPL W/O P-5'-P-CCNC: 28 U/L (ref 10–44)
ANION GAP SERPL CALC-SCNC: 7 MMOL/L (ref 8–16)
AST SERPL-CCNC: 44 U/L (ref 10–40)
BILIRUB SERPL-MCNC: 0.6 MG/DL (ref 0.1–1)
BILIRUB UR QL STRIP: NEGATIVE
BUN SERPL-MCNC: 17 MG/DL (ref 6–20)
CALCIUM SERPL-MCNC: 9.8 MG/DL (ref 8.7–10.5)
CHLORIDE SERPL-SCNC: 102 MMOL/L (ref 95–110)
CHOLEST SERPL-MCNC: 196 MG/DL (ref 120–199)
CHOLEST/HDLC SERPL: 4 {RATIO} (ref 2–5)
CLARITY UR REFRACT.AUTO: CLEAR
CO2 SERPL-SCNC: 29 MMOL/L (ref 23–29)
COLOR UR AUTO: YELLOW
COMPLEXED PSA SERPL-MCNC: 0.57 NG/ML (ref 0–4)
CREAT SERPL-MCNC: 0.9 MG/DL (ref 0.5–1.4)
EST. GFR  (NO RACE VARIABLE): >60 ML/MIN/1.73 M^2
GLUCOSE SERPL-MCNC: 96 MG/DL (ref 70–110)
GLUCOSE UR QL STRIP: NEGATIVE
HDLC SERPL-MCNC: 49 MG/DL (ref 40–75)
HDLC SERPL: 25 % (ref 20–50)
HGB UR QL STRIP: NEGATIVE
KETONES UR QL STRIP: NEGATIVE
LDLC SERPL CALC-MCNC: 118 MG/DL (ref 63–159)
LEUKOCYTE ESTERASE UR QL STRIP: NEGATIVE
NITRITE UR QL STRIP: NEGATIVE
NONHDLC SERPL-MCNC: 147 MG/DL
PH UR STRIP: 8 [PH] (ref 5–8)
POTASSIUM SERPL-SCNC: 4.4 MMOL/L (ref 3.5–5.1)
PROT SERPL-MCNC: 7.6 G/DL (ref 6–8.4)
PROT UR QL STRIP: NEGATIVE
SODIUM SERPL-SCNC: 138 MMOL/L (ref 136–145)
SP GR UR STRIP: 1.01 (ref 1–1.03)
TRIGL SERPL-MCNC: 145 MG/DL (ref 30–150)
URN SPEC COLLECT METH UR: NORMAL

## 2023-07-03 PROCEDURE — 99396 PR PREVENTIVE VISIT,EST,40-64: ICD-10-PCS | Mod: S$GLB,,, | Performed by: INTERNAL MEDICINE

## 2023-07-03 PROCEDURE — 3078F DIAST BP <80 MM HG: CPT | Mod: CPTII,S$GLB,, | Performed by: INTERNAL MEDICINE

## 2023-07-03 PROCEDURE — 36415 COLL VENOUS BLD VENIPUNCTURE: CPT | Performed by: INTERNAL MEDICINE

## 2023-07-03 PROCEDURE — 80053 COMPREHEN METABOLIC PANEL: CPT | Performed by: INTERNAL MEDICINE

## 2023-07-03 PROCEDURE — 84153 ASSAY OF PSA TOTAL: CPT | Performed by: INTERNAL MEDICINE

## 2023-07-03 PROCEDURE — 1159F PR MEDICATION LIST DOCUMENTED IN MEDICAL RECORD: ICD-10-PCS | Mod: CPTII,S$GLB,, | Performed by: INTERNAL MEDICINE

## 2023-07-03 PROCEDURE — 81003 URINALYSIS AUTO W/O SCOPE: CPT | Performed by: INTERNAL MEDICINE

## 2023-07-03 PROCEDURE — 3078F PR MOST RECENT DIASTOLIC BLOOD PRESSURE < 80 MM HG: ICD-10-PCS | Mod: CPTII,S$GLB,, | Performed by: INTERNAL MEDICINE

## 2023-07-03 PROCEDURE — 1160F PR REVIEW ALL MEDS BY PRESCRIBER/CLIN PHARMACIST DOCUMENTED: ICD-10-PCS | Mod: CPTII,S$GLB,, | Performed by: INTERNAL MEDICINE

## 2023-07-03 PROCEDURE — 99999 PR PBB SHADOW E&M-EST. PATIENT-LVL IV: CPT | Mod: PBBFAC,,, | Performed by: INTERNAL MEDICINE

## 2023-07-03 PROCEDURE — 99999 PR PBB SHADOW E&M-EST. PATIENT-LVL IV: ICD-10-PCS | Mod: PBBFAC,,, | Performed by: INTERNAL MEDICINE

## 2023-07-03 PROCEDURE — 1160F RVW MEDS BY RX/DR IN RCRD: CPT | Mod: CPTII,S$GLB,, | Performed by: INTERNAL MEDICINE

## 2023-07-03 PROCEDURE — 1159F MED LIST DOCD IN RCRD: CPT | Mod: CPTII,S$GLB,, | Performed by: INTERNAL MEDICINE

## 2023-07-03 PROCEDURE — 3074F SYST BP LT 130 MM HG: CPT | Mod: CPTII,S$GLB,, | Performed by: INTERNAL MEDICINE

## 2023-07-03 PROCEDURE — 99396 PREV VISIT EST AGE 40-64: CPT | Mod: S$GLB,,, | Performed by: INTERNAL MEDICINE

## 2023-07-03 PROCEDURE — 80061 LIPID PANEL: CPT | Performed by: INTERNAL MEDICINE

## 2023-07-03 PROCEDURE — 3008F BODY MASS INDEX DOCD: CPT | Mod: CPTII,S$GLB,, | Performed by: INTERNAL MEDICINE

## 2023-07-03 PROCEDURE — 3074F PR MOST RECENT SYSTOLIC BLOOD PRESSURE < 130 MM HG: ICD-10-PCS | Mod: CPTII,S$GLB,, | Performed by: INTERNAL MEDICINE

## 2023-07-03 PROCEDURE — 3008F PR BODY MASS INDEX (BMI) DOCUMENTED: ICD-10-PCS | Mod: CPTII,S$GLB,, | Performed by: INTERNAL MEDICINE

## 2023-07-03 NOTE — PROGRESS NOTES
Subjective:       Patient ID: Robby Maya is a 52 y.o. male.    Chief Complaint:   Annual Exam and Numbness (Left arm)    HPI - he feels well today.  Says his mood is good.  His OCD is under pretty good control, but he still has trouble focusing.  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  He still urinates frequently at night up to 3 times.  I think this is due to ongoing polydipsia, but he would like to talk to Urology.  Did not take the medication I recommended.  He is a nonsmoker and nondrinker.  He is sexually active with men but not currently sexually active.    PMH:  Anxiety  Obsessive-Compulsive disorder  Psychogenic Polydipsia, controlled  GERD  Bladder spasms     Meds: Reviewed and reconciled in EPIC with patient during visit today.    Review of Systems   Constitutional:  Negative for fever.   HENT:  Negative for congestion.    Respiratory:  Negative for shortness of breath.    Cardiovascular:  Negative for chest pain.   Gastrointestinal:  Negative for abdominal pain.   Genitourinary:  Positive for frequency.   Musculoskeletal:  Negative for arthralgias.   Skin:  Negative for rash.   Neurological:  Negative for headaches.   Psychiatric/Behavioral:  Negative for dysphoric mood. The patient is nervous/anxious.      Objective:      Physical Exam  Vitals reviewed.   Constitutional:       General: He is not in acute distress.     Appearance: He is well-developed. He is not diaphoretic.   HENT:      Head: Normocephalic and atraumatic.   Cardiovascular:      Rate and Rhythm: Normal rate and regular rhythm.      Heart sounds: Normal heart sounds. No murmur heard.    No friction rub. No gallop.   Pulmonary:      Effort: No respiratory distress.      Breath sounds: No wheezing or rales.   Chest:      Chest wall: No tenderness.   Skin:     General: Skin is warm.      Findings: No erythema.   Neurological:      Mental Status: He is alert and oriented to person, place, and time.    Psychiatric:         Thought Content: Thought content normal.       Assessment:       1. Routine history and physical examination of adult    2. Screening for prostate cancer    3. Obsessive-compulsive behavior        Plan:       Robby was seen today for annual exam and numbness.    Diagnoses and all orders for this visit:    Routine history and physical examination of adult - he's doing well.  Updating labs.  Needs colonoscopy  -     PSA, Screening; Future  -     Comprehensive metabolic panel; Future  -     Lipid panel; Future  -     Ambulatory referral/consult to Endo Procedure ; Future  -     Ambulatory referral/consult to Urology; Future  -     Urinalysis    Screening for prostate cancer - sending psa.  Asking urology to comment on urinary frequency  -     PSA, Screening; Future    Obsessive-compulsive behavior - pretty good control.  Continue zoloft     Rtc prn    G Jomar Velarde MD MPH  Staff Internist

## 2023-08-11 DIAGNOSIS — R46.81 OBSESSIVE-COMPULSIVE BEHAVIOR: ICD-10-CM

## 2023-08-11 RX ORDER — SERTRALINE HYDROCHLORIDE 100 MG/1
100 TABLET, FILM COATED ORAL NIGHTLY
Qty: 90 TABLET | Refills: 3 | Status: SHIPPED | OUTPATIENT
Start: 2023-08-11

## 2023-08-11 NOTE — TELEPHONE ENCOUNTER
No care due was identified.  [facility] Embedded Care Due Messages. Reference number: [id number].   8/11/2023 9:24:32 AM CDT

## 2023-08-11 NOTE — TELEPHONE ENCOUNTER
Refill Decision Note   Robby Zulma  is requesting a refill authorization.  Brief Assessment and Rationale for Refill:  Approve     Medication Therapy Plan:         Comments:     Note composed:10:46 AM 08/11/2023

## 2023-10-22 ENCOUNTER — OFFICE VISIT (OUTPATIENT)
Dept: URGENT CARE | Facility: CLINIC | Age: 53
End: 2023-10-22
Payer: COMMERCIAL

## 2023-10-22 VITALS
DIASTOLIC BLOOD PRESSURE: 70 MMHG | WEIGHT: 205 LBS | HEIGHT: 68 IN | RESPIRATION RATE: 20 BRPM | BODY MASS INDEX: 31.07 KG/M2 | SYSTOLIC BLOOD PRESSURE: 128 MMHG | TEMPERATURE: 97 F | HEART RATE: 80 BPM | OXYGEN SATURATION: 98 %

## 2023-10-22 DIAGNOSIS — R60.9 EDEMA, UNSPECIFIED TYPE: Primary | ICD-10-CM

## 2023-10-22 DIAGNOSIS — S63.656A SPRAIN OF METACARPOPHALANGEAL (MCP) JOINT OF RIGHT LITTLE FINGER, INITIAL ENCOUNTER: ICD-10-CM

## 2023-10-22 PROCEDURE — 73130 X-RAY EXAM OF HAND: CPT | Mod: FY,RT,S$GLB, | Performed by: RADIOLOGY

## 2023-10-22 PROCEDURE — 99213 PR OFFICE/OUTPT VISIT, EST, LEVL III, 20-29 MIN: ICD-10-PCS | Mod: S$GLB,,, | Performed by: FAMILY MEDICINE

## 2023-10-22 PROCEDURE — 99213 OFFICE O/P EST LOW 20 MIN: CPT | Mod: S$GLB,,, | Performed by: FAMILY MEDICINE

## 2023-10-22 PROCEDURE — 73130 XR HAND COMPLETE 3 VIEW RIGHT: ICD-10-PCS | Mod: FY,RT,S$GLB, | Performed by: RADIOLOGY

## 2023-10-22 RX ORDER — IBUPROFEN 800 MG/1
800 TABLET ORAL 3 TIMES DAILY
Qty: 30 TABLET | Refills: 0 | Status: SHIPPED | OUTPATIENT
Start: 2023-10-22

## 2023-10-22 NOTE — PROGRESS NOTES
Subjective:      Patient ID: Robby Maya is a 53 y.o. male.    Chief Complaint: No chief complaint on file.    HPI  ROS  Objective:     Physical Exam   Assessment:      No diagnosis found.  Plan:                 No follow-ups on file.

## 2023-10-22 NOTE — PROGRESS NOTES
"Subjective:      Patient ID: Robby Maya is a 53 y.o. male.    Vitals:  height is 5' 8" (1.727 m) and weight is 93 kg (205 lb). His oral temperature is 97.1 °F (36.2 °C). His blood pressure is 128/70 and his pulse is 80. His respiration is 20 and oxygen saturation is 98%.     Chief Complaint: hand/ 5th digit redness & swelling     Pt states he is having right hand swelling and 5th digit redness and edema, on Thursday he was at the gym  unsure if his hand was injured, pain scale 8/10 ,  Was working out ,push ups 3 days prior,  Re injured yesterday after doing pull up        Hand Pain   The incident occurred 3 to 5 days ago. The injury mechanism is unknown. Pain location: right hand, 5th digit. The pain is at a severity of 8/10.     ROS   Objective:     Physical Exam   Constitutional: He is oriented to person, place, and time. He appears well-developed. He is cooperative.  Non-toxic appearance. He does not appear ill. No distress.   HENT:   Head: Normocephalic and atraumatic.   Ears:   Right Ear: Hearing, tympanic membrane, external ear and ear canal normal.   Left Ear: Hearing, tympanic membrane, external ear and ear canal normal.   Nose: Nose normal. No mucosal edema, rhinorrhea or nasal deformity. No epistaxis. Right sinus exhibits no maxillary sinus tenderness and no frontal sinus tenderness. Left sinus exhibits no maxillary sinus tenderness and no frontal sinus tenderness.   Mouth/Throat: Uvula is midline, oropharynx is clear and moist and mucous membranes are normal. Mucous membranes are moist. No trismus in the jaw. Normal dentition. No uvula swelling. No posterior oropharyngeal erythema. Oropharynx is clear.   Eyes: Conjunctivae and lids are normal. Pupils are equal, round, and reactive to light. Right eye exhibits no discharge. Left eye exhibits no discharge. No scleral icterus. Extraocular movement intact   Neck: Trachea normal and phonation normal. Neck supple.   Cardiovascular: Normal rate, " regular rhythm, normal heart sounds and normal pulses.   Pulmonary/Chest: Effort normal and breath sounds normal. No respiratory distress.   Abdominal: Normal appearance and bowel sounds are normal. He exhibits no distension and no mass. Soft. There is no abdominal tenderness.   Musculoskeletal: Normal range of motion.         General: No deformity. Normal range of motion.      Comments: Right hand with mild swelling and ttp at the base of right 5th finger, opponens with 5th finger and thumb, decreased ROM     Neurological: He is alert and oriented to person, place, and time. He exhibits normal muscle tone. Coordination normal.   Skin: Skin is warm, dry, intact, not diaphoretic and not pale.   Psychiatric: His speech is normal and behavior is normal. Judgment and thought content normal.   Nursing note and vitals reviewed.  Dictation #1  MRN:[id number]  CSN:[id number]     Assessment:     1. Edema, unspecified type    2. Sprain of metacarpophalangeal (MCP) joint of right little finger, initial encounter        Plan:       Edema, unspecified type  -     X-Ray Hand 3 View Right; Future; Expected date: 10/22/2023    Sprain of metacarpophalangeal (MCP) joint of right little finger, initial encounter    Other orders  -     ibuprofen (ADVIL,MOTRIN) 800 MG tablet; Take 1 tablet (800 mg total) by mouth 3 (three) times daily.  Dispense: 30 tablet; Refill: 0         Apply ice    Ace wrap

## 2023-12-19 ENCOUNTER — OFFICE VISIT (OUTPATIENT)
Dept: UROLOGY | Facility: CLINIC | Age: 53
End: 2023-12-19
Payer: COMMERCIAL

## 2023-12-19 ENCOUNTER — LAB VISIT (OUTPATIENT)
Dept: LAB | Facility: HOSPITAL | Age: 53
End: 2023-12-19
Attending: UROLOGY
Payer: COMMERCIAL

## 2023-12-19 VITALS
WEIGHT: 198.44 LBS | HEIGHT: 68 IN | BODY MASS INDEX: 30.07 KG/M2 | DIASTOLIC BLOOD PRESSURE: 84 MMHG | HEART RATE: 67 BPM | SYSTOLIC BLOOD PRESSURE: 124 MMHG

## 2023-12-19 DIAGNOSIS — N40.1 BPH ASSOCIATED WITH NOCTURIA: ICD-10-CM

## 2023-12-19 DIAGNOSIS — N40.1 BPH ASSOCIATED WITH NOCTURIA: Primary | ICD-10-CM

## 2023-12-19 DIAGNOSIS — F54 PSYCHOGENIC POLYDIPSIA: ICD-10-CM

## 2023-12-19 DIAGNOSIS — R63.1 PSYCHOGENIC POLYDIPSIA: ICD-10-CM

## 2023-12-19 DIAGNOSIS — R35.1 BPH ASSOCIATED WITH NOCTURIA: Primary | ICD-10-CM

## 2023-12-19 DIAGNOSIS — R35.1 NOCTURIA: ICD-10-CM

## 2023-12-19 DIAGNOSIS — R35.1 BPH ASSOCIATED WITH NOCTURIA: ICD-10-CM

## 2023-12-19 LAB — COMPLEXED PSA SERPL-MCNC: 0.69 NG/ML (ref 0–4)

## 2023-12-19 PROCEDURE — 3079F DIAST BP 80-89 MM HG: CPT | Mod: CPTII,S$GLB,, | Performed by: UROLOGY

## 2023-12-19 PROCEDURE — 3008F BODY MASS INDEX DOCD: CPT | Mod: CPTII,S$GLB,, | Performed by: UROLOGY

## 2023-12-19 PROCEDURE — 99999 PR PBB SHADOW E&M-EST. PATIENT-LVL III: CPT | Mod: PBBFAC,,, | Performed by: UROLOGY

## 2023-12-19 PROCEDURE — 99204 PR OFFICE/OUTPT VISIT, NEW, LEVL IV, 45-59 MIN: ICD-10-PCS | Mod: S$GLB,,, | Performed by: UROLOGY

## 2023-12-19 PROCEDURE — 84153 ASSAY OF PSA TOTAL: CPT | Performed by: UROLOGY

## 2023-12-19 PROCEDURE — 3008F PR BODY MASS INDEX (BMI) DOCUMENTED: ICD-10-PCS | Mod: CPTII,S$GLB,, | Performed by: UROLOGY

## 2023-12-19 PROCEDURE — 99204 OFFICE O/P NEW MOD 45 MIN: CPT | Mod: S$GLB,,, | Performed by: UROLOGY

## 2023-12-19 PROCEDURE — 99999 PR PBB SHADOW E&M-EST. PATIENT-LVL III: ICD-10-PCS | Mod: PBBFAC,,, | Performed by: UROLOGY

## 2023-12-19 PROCEDURE — 3074F PR MOST RECENT SYSTOLIC BLOOD PRESSURE < 130 MM HG: ICD-10-PCS | Mod: CPTII,S$GLB,, | Performed by: UROLOGY

## 2023-12-19 PROCEDURE — 36415 COLL VENOUS BLD VENIPUNCTURE: CPT | Performed by: UROLOGY

## 2023-12-19 PROCEDURE — 1159F MED LIST DOCD IN RCRD: CPT | Mod: CPTII,S$GLB,, | Performed by: UROLOGY

## 2023-12-19 PROCEDURE — 3079F PR MOST RECENT DIASTOLIC BLOOD PRESSURE 80-89 MM HG: ICD-10-PCS | Mod: CPTII,S$GLB,, | Performed by: UROLOGY

## 2023-12-19 PROCEDURE — 1159F PR MEDICATION LIST DOCUMENTED IN MEDICAL RECORD: ICD-10-PCS | Mod: CPTII,S$GLB,, | Performed by: UROLOGY

## 2023-12-19 PROCEDURE — 3074F SYST BP LT 130 MM HG: CPT | Mod: CPTII,S$GLB,, | Performed by: UROLOGY

## 2023-12-19 RX ORDER — OXYBUTYNIN CHLORIDE 10 MG/1
10 TABLET, EXTENDED RELEASE ORAL DAILY
Qty: 30 TABLET | Refills: 11 | Status: SHIPPED | OUTPATIENT
Start: 2023-12-19 | End: 2024-12-18

## 2023-12-19 NOTE — PROGRESS NOTES
CC: nocturia every 2 hours    Robby Maya is a 53 y.o. man who is here for the evaluation of Urinary Frequency    A new pt referred by his PCP, Gabriel Velarde II, MD   His OCD is under pretty good control, but he still has trouble focusing.  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  He still urinates frequently at night up to 3 times.  I think this is due to ongoing polydipsia, but he would like to talk to Urology.  Did not take the medication I recommended.  He is a nonsmoker and nondrinker.  He is sexually active with men but not currently sexually active.     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  He tends to drink coffee 4 to 5 cups, green teas and water 8 to 10 glasses ( or more) a day.  He voids every 2 to 3 hours during the day time due to fullness of the bladder ( volume), every 2 hours at night due to urgency ( discomfort) but with much less volume of urine .  States his urine flow is fine.    His brother had an elevated PSA and he would like to check his PSA as well.  No family hx of prostate cancer.      Past Medical History:   Diagnosis Date    Anxiety     Depression     Obsessive compulsive disorder     Palpitations      Past Surgical History:   Procedure Laterality Date    removal rhinoplasty      RHINOPLASTY TIP       Social History     Tobacco Use    Smoking status: Never    Smokeless tobacco: Never   Substance Use Topics    Alcohol use: Never    Drug use: Never     Family History   Problem Relation Age of Onset    Stroke Father     Heart disease Father     Diabetes Father     Heart attack Father     Diabetes Paternal Aunt     Diabetes Paternal Uncle      Allergy:  Review of patient's allergies indicates:  No Known Allergies  Outpatient Encounter Medications as of 12/19/2023   Medication Sig Dispense Refill    hydrOXYzine HCL (ATARAX) 25 MG tablet TAKE 1 TABLET BY MOUTH EVERY NIGHT AS NEEDED FOR ANXIETY 60 tablet 3    ibuprofen  (ADVIL,MOTRIN) 800 MG tablet Take 1 tablet (800 mg total) by mouth 3 (three) times daily. 30 tablet 0    oxybutynin (DITROPAN-XL) 10 MG 24 hr tablet Take 1 tablet (10 mg total) by mouth once daily. 30 tablet 11    sertraline (ZOLOFT) 100 MG tablet TAKE 1 TABLET BY MOUTH EVERY EVENING 90 tablet 3     No facility-administered encounter medications on file as of 12/19/2023.     Review of Systems   ROS  Physical Exam     Vitals:    12/19/23 0805   BP: 124/84   Pulse: 67     Physical Exam  Constitutional:       General: He is not in acute distress.     Appearance: He is well-developed. He is not diaphoretic.   HENT:      Head: Normocephalic and atraumatic.      Right Ear: External ear normal.      Left Ear: External ear normal.      Nose: Nose normal.   Eyes:      Conjunctiva/sclera: Conjunctivae normal.      Pupils: Pupils are equal, round, and reactive to light.   Neck:      Thyroid: No thyromegaly.      Vascular: No JVD.      Trachea: No tracheal deviation.   Cardiovascular:      Rate and Rhythm: Normal rate and regular rhythm.      Heart sounds: Normal heart sounds. No murmur heard.     No friction rub. No gallop.   Pulmonary:      Effort: Pulmonary effort is normal. No respiratory distress.      Breath sounds: Normal breath sounds. No wheezing.   Chest:      Chest wall: No tenderness.   Abdominal:      General: Bowel sounds are normal. There is no distension.      Palpations: Abdomen is soft. There is no mass.      Tenderness: There is no abdominal tenderness. There is no guarding or rebound.   Genitourinary:     Penis: Normal. No tenderness.       Rectum: Normal.      Comments: Prostate 15 grams with negative nodule or negative tenderness  No tenderness noted on his levator ani.  Able to contract and relax without any pain.    Musculoskeletal:         General: No tenderness or deformity. Normal range of motion.      Cervical back: Normal range of motion and neck supple.   Lymphadenopathy:      Cervical: No cervical  adenopathy.   Skin:     General: Skin is warm and dry.   Neurological:      Mental Status: He is alert and oriented to person, place, and time.   Psychiatric:         Behavior: Behavior normal.         Thought Content: Thought content normal.       Genitalia:  Scrotum: no rash or lesion  Normal symmetric epididymis without masses  Normal vas palpated  Normal size, symmetric testicles with no masses   Normal urethral meatus with no discharge  Normal circumcised penis with no lesion         LABS:  Lab Results   Component Value Date    PSA 0.57 07/03/2023    PSA 0.61 06/21/2022    PSA 0.74 12/03/2021    PSA 0.84 11/12/2020    PSA 0.71 06/06/2019    PSA 0.75 08/13/2015    PSA 0.69 08/06/2010    PSA 0.51 02/04/2009     No results found for this or any previous visit.  Lab Results   Component Value Date    CREATININE 0.9 07/03/2023    CREATININE 0.9 06/21/2022    CREATININE 0.8 12/03/2021     Results for orders placed or performed in visit on 05/11/17   Testosterone   Result Value Ref Range    Testosterone, Total 429 195.0 - 1138.0 ng/dL     Urine Culture, Routine   Date Value Ref Range Status   03/23/2016 No growth  Final     Hemoglobin A1C   Date Value Ref Range Status   12/03/2021 5.5 4.0 - 5.6 % Final     Comment:     ADA Screening Guidelines:  5.7-6.4%  Consistent with prediabetes  >or=6.5%  Consistent with diabetes    High levels of fetal hemoglobin interfere with the HbA1C  assay. Heterozygous hemoglobin variants (HbS, HgC, etc)do  not significantly interfere with this assay.   However, presence of multiple variants may affect accuracy.     05/11/2017 5.8 4.5 - 6.2 % Final     Comment:     According to ADA guidelines, hemoglobin A1C <7.0% represents  optimal control in non-pregnant diabetic patients.  Different  metrics may apply to specific populations.   Standards of Medical Care in Diabetes - 2016.  For the purpose of screening for the presence of diabetes:  <5.7%     Consistent with the absence of  diabetes  5.7-6.4%  Consistent with increasing risk for diabetes   (prediabetes)  >or=6.5%  Consistent with diabetes  Currently no consensus exists for use of hemoglobin A1C  for diagnosis of diabetes for children.         UA pH 8, clear  PVR per bladder scan: 2 ml    Assessment and Plan:  Robby was seen today for urinary frequency.    Diagnoses and all orders for this visit:    BPH associated with nocturia  -     Prostate Specific Antigen, Diagnostic; Future    Psychogenic polydipsia    Nocturia  -     oxybutynin (DITROPAN-XL) 10 MG 24 hr tablet; Take 1 tablet (10 mg total) by mouth once daily.    Discussed in depth about his urologic condition.  First he needs to cut down fluid intake especially 4 hours before night time.  Avoid drinking too much coffee and green tea.  Avoid drinking too much water unless he needs to.  Voiding diary day vs night baseline and after taking OAB meds such as Oxybutynin xl nightly.  I informed him that pt's response is best with behavioral modification with medication.    Depending on how he responds, will determine whether he will need additional evaluation.  All questions answered.    PSA today    Follow-up:  Follow up in about 3 months (around 3/18/2024).

## 2024-02-04 ENCOUNTER — PATIENT MESSAGE (OUTPATIENT)
Dept: ADMINISTRATIVE | Facility: HOSPITAL | Age: 54
End: 2024-02-04
Payer: COMMERCIAL

## 2024-02-05 DIAGNOSIS — Z12.11 SCREENING FOR COLON CANCER: ICD-10-CM

## 2024-03-25 DIAGNOSIS — R46.81 OBSESSIVE-COMPULSIVE BEHAVIOR: ICD-10-CM

## 2024-03-25 RX ORDER — HYDROXYZINE HYDROCHLORIDE 25 MG/1
TABLET, FILM COATED ORAL
Qty: 60 TABLET | Refills: 3 | Status: SHIPPED | OUTPATIENT
Start: 2024-03-25

## 2024-03-25 NOTE — TELEPHONE ENCOUNTER
----- Message from Nena Kline sent at 3/25/2024  9:29 AM CDT -----  Contact: Self [phone]  Requesting an RX refill or new RX.    Is this a refill or new RX: refill    RX name and strength (copy/paste from chart):  hydrOXYzine HCL (ATARAX) 25 MG tablet     Is this a 30 day or 90 day RX: 30    Pharmacy name and phone # (copy/paste from chart):      [id number] - [place] [address] AT [facility]  [address] [facility]  [place] [address]  Phone: [phone] Fax: [phone]

## 2024-06-10 ENCOUNTER — PATIENT MESSAGE (OUTPATIENT)
Dept: INTERNAL MEDICINE | Facility: CLINIC | Age: 54
End: 2024-06-10
Payer: COMMERCIAL

## 2024-06-13 ENCOUNTER — LAB VISIT (OUTPATIENT)
Dept: LAB | Facility: HOSPITAL | Age: 54
End: 2024-06-13
Attending: INTERNAL MEDICINE
Payer: COMMERCIAL

## 2024-06-13 ENCOUNTER — OFFICE VISIT (OUTPATIENT)
Dept: INTERNAL MEDICINE | Facility: CLINIC | Age: 54
End: 2024-06-13
Payer: COMMERCIAL

## 2024-06-13 VITALS
HEART RATE: 59 BPM | HEIGHT: 68 IN | DIASTOLIC BLOOD PRESSURE: 66 MMHG | BODY MASS INDEX: 31 KG/M2 | SYSTOLIC BLOOD PRESSURE: 118 MMHG | WEIGHT: 204.56 LBS

## 2024-06-13 DIAGNOSIS — Z00.00 ROUTINE HISTORY AND PHYSICAL EXAMINATION OF ADULT: Primary | ICD-10-CM

## 2024-06-13 DIAGNOSIS — Z12.11 SCREEN FOR COLON CANCER: ICD-10-CM

## 2024-06-13 DIAGNOSIS — Z00.00 ROUTINE HISTORY AND PHYSICAL EXAMINATION OF ADULT: ICD-10-CM

## 2024-06-13 LAB
ALBUMIN SERPL BCP-MCNC: 4.1 G/DL (ref 3.5–5.2)
ALP SERPL-CCNC: 88 U/L (ref 55–135)
ALT SERPL W/O P-5'-P-CCNC: 28 U/L (ref 10–44)
ANION GAP SERPL CALC-SCNC: 9 MMOL/L (ref 8–16)
AST SERPL-CCNC: 43 U/L (ref 10–40)
BILIRUB SERPL-MCNC: 0.5 MG/DL (ref 0.1–1)
BUN SERPL-MCNC: 15 MG/DL (ref 6–20)
CALCIUM SERPL-MCNC: 10 MG/DL (ref 8.7–10.5)
CHLORIDE SERPL-SCNC: 102 MMOL/L (ref 95–110)
CHOLEST SERPL-MCNC: 227 MG/DL (ref 120–199)
CHOLEST/HDLC SERPL: 4.5 {RATIO} (ref 2–5)
CO2 SERPL-SCNC: 26 MMOL/L (ref 23–29)
CREAT SERPL-MCNC: 0.9 MG/DL (ref 0.5–1.4)
EST. GFR  (NO RACE VARIABLE): >60 ML/MIN/1.73 M^2
GLUCOSE SERPL-MCNC: 99 MG/DL (ref 70–110)
HDLC SERPL-MCNC: 51 MG/DL (ref 40–75)
HDLC SERPL: 22.5 % (ref 20–50)
LDLC SERPL CALC-MCNC: 149.4 MG/DL (ref 63–159)
NONHDLC SERPL-MCNC: 176 MG/DL
POTASSIUM SERPL-SCNC: 3.9 MMOL/L (ref 3.5–5.1)
PROT SERPL-MCNC: 7.9 G/DL (ref 6–8.4)
SODIUM SERPL-SCNC: 137 MMOL/L (ref 136–145)
TRIGL SERPL-MCNC: 133 MG/DL (ref 30–150)

## 2024-06-13 PROCEDURE — 80061 LIPID PANEL: CPT | Performed by: INTERNAL MEDICINE

## 2024-06-13 PROCEDURE — 99396 PREV VISIT EST AGE 40-64: CPT | Mod: S$GLB,,, | Performed by: INTERNAL MEDICINE

## 2024-06-13 PROCEDURE — 36415 COLL VENOUS BLD VENIPUNCTURE: CPT | Performed by: INTERNAL MEDICINE

## 2024-06-13 PROCEDURE — 3078F DIAST BP <80 MM HG: CPT | Mod: CPTII,S$GLB,, | Performed by: INTERNAL MEDICINE

## 2024-06-13 PROCEDURE — 3074F SYST BP LT 130 MM HG: CPT | Mod: CPTII,S$GLB,, | Performed by: INTERNAL MEDICINE

## 2024-06-13 PROCEDURE — 1159F MED LIST DOCD IN RCRD: CPT | Mod: CPTII,S$GLB,, | Performed by: INTERNAL MEDICINE

## 2024-06-13 PROCEDURE — 99999 PR PBB SHADOW E&M-EST. PATIENT-LVL IV: CPT | Mod: PBBFAC,,, | Performed by: INTERNAL MEDICINE

## 2024-06-13 PROCEDURE — 80053 COMPREHEN METABOLIC PANEL: CPT | Performed by: INTERNAL MEDICINE

## 2024-06-13 PROCEDURE — 1160F RVW MEDS BY RX/DR IN RCRD: CPT | Mod: CPTII,S$GLB,, | Performed by: INTERNAL MEDICINE

## 2024-06-13 PROCEDURE — 3008F BODY MASS INDEX DOCD: CPT | Mod: CPTII,S$GLB,, | Performed by: INTERNAL MEDICINE

## 2024-06-13 NOTE — PROGRESS NOTES
Subjective:       Patient ID: Robby Maya is a 53 y.o. male.    Chief Complaint:   Annual Exam    HPI - Robby feels well today.  He has only been taking half of his Zoloft dose, and his mood is worse.  He still has OCD behaviors, but those are improved.  He is due for colon cancer screening, and agreed to colonoscopy this time.  He is not a smoker.  He is sexually active with men, but does not have a partner currently.  No illicit substance use.  He does not drink alcohol.  He has a  at [employer]    PMH:  Anxiety  Obsessive-Compulsive disorder  Psychogenic Polydipsia, resolved  GERD  Bladder spasms - off treatment     Meds: Reviewed and reconciled in EPIC with patient during visit today.     Review of Systems   Constitutional:  Negative for activity change and unexpected weight change.   HENT:  Negative for hearing loss, rhinorrhea and trouble swallowing.    Eyes:  Negative for discharge and visual disturbance.   Respiratory:  Negative for chest tightness and wheezing.    Cardiovascular:  Negative for chest pain and palpitations.   Gastrointestinal:  Negative for blood in stool, constipation, diarrhea and vomiting.   Endocrine: Negative for polydipsia and polyuria.   Genitourinary:  Negative for difficulty urinating, hematuria and urgency.   Musculoskeletal:  Negative for arthralgias, joint swelling and neck pain.   Skin:  Negative for rash.   Neurological:  Negative for weakness and headaches.   Psychiatric/Behavioral:  Negative for confusion and dysphoric mood.        Objective:      Physical Exam  Constitutional:       General: He is not in acute distress.     Appearance: He is well-developed. He is not diaphoretic.      Comments: Friendly, well-appearing man in no distress   HENT:      Head: Normocephalic and atraumatic.   Cardiovascular:      Rate and Rhythm: Normal rate and regular rhythm.      Heart sounds: Normal heart sounds. No murmur heard.     No friction rub. No gallop.    Pulmonary:      Effort: No respiratory distress.      Breath sounds: No wheezing or rales.   Chest:      Chest wall: No tenderness.   Skin:     General: Skin is warm.      Findings: No erythema.   Neurological:      Mental Status: He is alert and oriented to person, place, and time.   Psychiatric:         Thought Content: Thought content normal.         Assessment:       1. Routine history and physical examination of adult    2. Screen for colon cancer        Plan:       Robby was seen today for annual exam.    Diagnoses and all orders for this visit:    Routine history and physical examination of adult - he is doing well.  Recommended weight loss.  I also recommended returning to 100 mg of Zoloft daily.  This will control his mood better.  -     Comprehensive metabolic panel; Future  -     Lipid panel; Future    Screen for colon cancer  -     Ambulatory referral/consult to Endo Procedure ; Future    RTC p.rtorres Velarde MD MPH  Staff Internist

## 2024-07-31 ENCOUNTER — CLINICAL SUPPORT (OUTPATIENT)
Dept: ENDOSCOPY | Facility: HOSPITAL | Age: 54
End: 2024-07-31
Attending: INTERNAL MEDICINE
Payer: COMMERCIAL

## 2024-07-31 ENCOUNTER — TELEPHONE (OUTPATIENT)
Dept: ENDOSCOPY | Facility: HOSPITAL | Age: 54
End: 2024-07-31

## 2024-07-31 DIAGNOSIS — Z12.11 SCREEN FOR COLON CANCER: ICD-10-CM

## 2024-07-31 NOTE — TELEPHONE ENCOUNTER
Called patient for PAT appointment to schedule colonoscopy. Patient stated he will call back tomorrow when he has calendar in hand. Phone number provided.

## 2024-08-08 ENCOUNTER — PATIENT MESSAGE (OUTPATIENT)
Dept: INTERNAL MEDICINE | Facility: CLINIC | Age: 54
End: 2024-08-08
Payer: COMMERCIAL

## 2024-08-09 ENCOUNTER — TELEPHONE (OUTPATIENT)
Dept: INTERNAL MEDICINE | Facility: CLINIC | Age: 54
End: 2024-08-09
Payer: COMMERCIAL

## 2024-08-15 ENCOUNTER — TELEPHONE (OUTPATIENT)
Dept: INTERNAL MEDICINE | Facility: CLINIC | Age: 54
End: 2024-08-15
Payer: COMMERCIAL

## 2024-08-15 NOTE — TELEPHONE ENCOUNTER
----- Message from Letitia Drake sent at 8/15/2024  8:30 AM CDT -----  Contact: Self/[phone]  1MEDICALADVICE     Patient is calling for Medical Advice regarding:Biometric form     How long has patient had these symptoms:NA    Pharmacy name and phone#:NA    Patient wants a call back or thru [facility] or call back     Comments:pt said that he is calling in regards to needing to know if the Biometric form is ready to be picked up     Please advise patient replies from provider may take up to 48 hours.

## 2024-08-20 ENCOUNTER — TELEPHONE (OUTPATIENT)
Dept: INTERNAL MEDICINE | Facility: CLINIC | Age: 54
End: 2024-08-20
Payer: COMMERCIAL

## 2024-08-20 NOTE — TELEPHONE ENCOUNTER
----- Message from CHRISTOAjay Gamboa sent at 8/20/2024 11:22 AM CDT -----  Contact: Self [phone]  Would like to receive medical advice.    Would they like a call back or a response via [facility]:  call back    Additional information:  Calling to speak with the office about getting his biometric form that was placed at the checkout counter to be faxed. Fax # is [phone]. Pt states that he is out of town and cannot drive to get it from the office due to an emergency but his insurance needs an copy.

## 2024-11-27 DIAGNOSIS — R46.81 OBSESSIVE-COMPULSIVE BEHAVIOR: ICD-10-CM

## 2024-11-27 RX ORDER — HYDROXYZINE HYDROCHLORIDE 25 MG/1
25 TABLET, FILM COATED ORAL NIGHTLY PRN
Qty: 60 TABLET | Refills: 3 | Status: SHIPPED | OUTPATIENT
Start: 2024-11-27

## 2024-11-27 NOTE — TELEPHONE ENCOUNTER
----- Message from Edwar sent at 11/27/2024  8:33 AM CST -----  Regarding: Refill  Contact: Pt [phone]  Requesting an RX refill or new RX.    Is this a refill or new RX: Refill    RX name and strength (copy/paste from chart):  hydrOXYzine HCL (ATARAX) 25 MG tablet    Is this a 30 day or 90 day RX:     Pharmacy name and phone # (copy/paste from chart):        [id number] - [place] [address] [address] AT [facility]  [address] [facility] [place] [address]  Phone: [phone] Fax: [phone]

## 2024-12-19 ENCOUNTER — OFFICE VISIT (OUTPATIENT)
Dept: INTERNAL MEDICINE | Facility: CLINIC | Age: 54
End: 2024-12-19
Payer: COMMERCIAL

## 2024-12-19 DIAGNOSIS — R46.81 OBSESSIVE-COMPULSIVE BEHAVIOR: Primary | ICD-10-CM

## 2024-12-19 DIAGNOSIS — F41.9 ANXIETY: ICD-10-CM

## 2024-12-19 NOTE — PROGRESS NOTES
Bereket Schaefer is a 47 y.o. male on day 6 of admission presenting with Hypokalemia and rhabdomyolysis.     Subjective   He has no acute complaints. He has no muscle aches today.        Objective     Physical Exam  General: awake, alert, oriented, responsive  Cardiovascular: regular, normal S1 and S2  Lungs: good air entry bilaterally, clear to auscultation  Abdomen: soft, nontender, bowel sounds present, normoactive  Extremities: no peripheral cyanosis, no pedal edema  Neuro: alert, oriented x 3, no focal weakness      Last Recorded Vitals  Blood pressure 136/75, pulse 68, temperature 36.5 °C (97.7 °F), temperature source Temporal, resp. rate 19, height 1.829 m (6'), weight 80.7 kg (177 lb 14.6 oz), SpO2 98%.  Intake/Output last 3 Shifts:  I/O last 3 completed shifts:  In: 6368.3 (78.9 mL/kg) [P.O.:1210; I.V.:5008.3 (62.1 mL/kg); IV Piggyback:150]  Out: 600 (7.4 mL/kg) [Urine:600 (0.2 mL/kg/hr)]  Weight: 80.7 kg     Relevant Results  Lab Results   Component Value Date    WBC 7.0 09/16/2024    HGB 11.2 (L) 09/16/2024    HCT 33.8 (L) 09/16/2024    MCV 92 09/16/2024     09/16/2024     Lab Results   Component Value Date    GLUCOSE 103 (H) 09/16/2024    CALCIUM 7.7 (L) 09/16/2024     09/16/2024    K 3.6 09/16/2024    CO2 26 09/16/2024     09/16/2024    BUN 5 (L) 09/16/2024    CREATININE 0.70 09/16/2024     Scheduled medications  enoxaparin, 40 mg, subcutaneous, q24h  escitalopram, 10 mg, oral, Daily  lidocaine, 5 mL, infiltration, Once  magnesium oxide, 400 mg, oral, BID  nicotine, 1 patch, transdermal, Daily  pantoprazole, 40 mg, oral, Daily before breakfast   Or  pantoprazole, 40 mg, intravenous, Daily before breakfast  piperacillin-tazobactam, 3.375 g, intravenous, q6h  potassium chloride, 40 mEq, oral, BID after meals      Continuous medications     PRN medications  PRN medications: acetaminophen **OR** acetaminophen **OR** acetaminophen, alteplase, diphenhydrAMINE, HYDROmorphone, loperamide,  Subjective:       Patient ID: Robby Maya is a 54 y.o. male.    Chief Complaint:   No chief complaint on file.    HPI - The patient location is: home in [place]  The chief complaint leading to consultation is: OCD    Visit type: audiovisual    Face to Face time with patient: 7:28  16:28 minutes of total time spent on the encounter, which includes face to face time and non-face to face time preparing to see the patient (eg, review of tests), Obtaining and/or reviewing separately obtained history, Documenting clinical information in the electronic or other health record, Independently interpreting results (not separately reported) and communicating results to the patient/family/caregiver, or Care coordination (not separately reported). Each patient to whom he or she provides medical services by telemedicine is:  (1) informed of the relationship between the physician and patient and the respective role of any other health care provider with respect to management of the patient; and (2) notified that he or she may decline to receive medical services by telemedicine and may withdraw from such care at any time.    Notes:  He feels pretty well today.  He has been adjusting his medications, taking a full dose of hydroxyzine before bedtime, and then when he wakes up in the middle of the night he takes half dose of sertraline.  He also supplements with over-the-counter melatonin and a EDWIGE supplement.  He says he feels edgy fairly frequently, mostly after the gym.  He uses pre workout for his workouts and drinks a lot of coffee.  Overall, his OCD and anxiety symptoms are better, but not as well-controlled as he would like.  He is not a smoker    PMH:  Anxiety  Obsessive-Compulsive disorder  Psychogenic Polydipsia, resolved  GERD  Bladder spasms - off treatment     Meds: Reviewed and reconciled in EPIC with patient during visit today.    Review of Systems   Constitutional:  Negative for activity change and  oxyCODONE           Assessment/Plan   Assessment & Plan    Acute rhabdomyolysis  Significantly improved    Hypokalemia  Improved, on KCl    Chronic back pain  No acute changes    Hypocalcemia  Improved    Hypomagnesemia  Improved    Elevated liver enzymes  Improving     Discharge home  Follow up with PCP in the office      Mel Mendoza MD       unexpected weight change.   HENT:  Negative for hearing loss, rhinorrhea and trouble swallowing.    Eyes:  Negative for discharge and visual disturbance.   Respiratory:  Negative for chest tightness and wheezing.    Cardiovascular:  Negative for chest pain and palpitations.   Gastrointestinal:  Negative for blood in stool, constipation, diarrhea and vomiting.   Endocrine: Negative for polydipsia and polyuria.   Genitourinary:  Negative for difficulty urinating, hematuria and urgency.   Musculoskeletal:  Negative for arthralgias, joint swelling and neck pain.   Skin:  Negative for rash.   Neurological:  Negative for weakness and headaches.   Psychiatric/Behavioral:  Negative for confusion and dysphoric mood. The patient is nervous/anxious.        Objective:      Physical Exam  Constitutional:       General: He is not in acute distress.     Appearance: Normal appearance. He is well-developed. He is not diaphoretic.   HENT:      Head: Normocephalic and atraumatic.   Pulmonary:      Effort: Pulmonary effort is normal.   Neurological:      Mental Status: He is alert and oriented to person, place, and time.   Psychiatric:         Thought Content: Thought content normal.         Assessment:       1. Obsessive-compulsive behavior    2. Anxiety        Plan:       Diagnoses and all orders for this visit:    Obsessive-compulsive behavior - improved.  I encouraged him to take medications as prescribed.  100 mg sertraline daily would provide more stability and reduce his need for nonprescription medications.    Anxiety - same as above.    RTC p.r.n. or in 6 months.  He will need lab work at that point    JIHAN Velarde MD MPH  Staff Internist

## 2025-02-11 DIAGNOSIS — R46.81 OBSESSIVE-COMPULSIVE BEHAVIOR: ICD-10-CM

## 2025-02-11 RX ORDER — SERTRALINE HYDROCHLORIDE 100 MG/1
100 TABLET, FILM COATED ORAL NIGHTLY
Qty: 90 TABLET | Refills: 3 | Status: SHIPPED | OUTPATIENT
Start: 2025-02-11 | End: 2025-02-14 | Stop reason: SDUPTHER

## 2025-02-11 NOTE — TELEPHONE ENCOUNTER
No care due was identified.  [facility] Embedded Care Due Messages. Reference number: [id number].   2/11/2025 10:20:21 AM CST

## 2025-02-11 NOTE — TELEPHONE ENCOUNTER
----- Message from Melodie sent at 2/11/2025 10:01 AM CST -----  Contact: [phone]  Requesting an RX refill or new RX.    Is this a refill or new RX: Refill 1    RX name and strength (copy/paste from chart):  sertraline (ZOLOFT) 100 MG tablet    Is this a 30 day or 90 day RX:     Pharmacy name and phone # (copy/paste from chart):  [facility].  [address] [place] [address] # [phone].     The doctors have asked that we provide their patients with the following 2 reminders -- prescription refills can take up to 72 hours, and a friendly reminder that in the future you can use your [facility] account to request refills:

## 2025-02-14 DIAGNOSIS — R46.81 OBSESSIVE-COMPULSIVE BEHAVIOR: ICD-10-CM

## 2025-02-14 RX ORDER — SERTRALINE HYDROCHLORIDE 100 MG/1
100 TABLET, FILM COATED ORAL NIGHTLY
Qty: 90 TABLET | Refills: 3 | Status: SHIPPED | OUTPATIENT
Start: 2025-02-14

## 2025-02-14 NOTE — TELEPHONE ENCOUNTER
No care due was identified.  [facility] Embedded Care Due Messages. Reference number: [id number].   2/14/2025 8:00:14 AM CST

## 2025-02-14 NOTE — TELEPHONE ENCOUNTER
----- Message from Edwar sent at 2/13/2025  4:06 PM CST -----  Regarding: Refill - Change of Pharmacy  Contact: Pt [phone]  Requesting an RX refill or new RX.    Is this a refill or new RX: Refill    RX name and strength (copy/paste from chart):  sertraline (ZOLOFT) 100 MG tablet    Is this a 30 day or 90 day RX:     Pharmacy name and phone # (copy/paste from chart):      [facility] - [place] [address] [address]  Phone: [phone] Fax: [phone]

## 2025-02-20 ENCOUNTER — TELEPHONE (OUTPATIENT)
Dept: INTERNAL MEDICINE | Facility: CLINIC | Age: 55
End: 2025-02-20
Payer: COMMERCIAL

## 2025-02-20 ENCOUNTER — OFFICE VISIT (OUTPATIENT)
Dept: URGENT CARE | Facility: CLINIC | Age: 55
End: 2025-02-20
Payer: COMMERCIAL

## 2025-02-20 VITALS
HEIGHT: 68 IN | OXYGEN SATURATION: 98 % | TEMPERATURE: 98 F | HEART RATE: 71 BPM | WEIGHT: 204 LBS | RESPIRATION RATE: 16 BRPM | BODY MASS INDEX: 30.92 KG/M2 | DIASTOLIC BLOOD PRESSURE: 74 MMHG | SYSTOLIC BLOOD PRESSURE: 143 MMHG

## 2025-02-20 DIAGNOSIS — T14.8XXA SPLINTER IN SKIN: Primary | ICD-10-CM

## 2025-02-20 PROCEDURE — 99213 OFFICE O/P EST LOW 20 MIN: CPT | Mod: S$GLB,,,

## 2025-02-20 RX ORDER — MUPIROCIN 20 MG/G
OINTMENT TOPICAL DAILY
Qty: 15 G | Refills: 0 | Status: SHIPPED | OUTPATIENT
Start: 2025-02-20

## 2025-02-20 NOTE — PATIENT INSTRUCTIONS
Rest and elevate the affected painful area.    Apply cold compresses intermittently as needed.    Avoid activities that cause pain.   As pain recedes, begin normal activities slowly as tolerated.      Alternate tylenol and ibuprofen every 4 hours as needed for pain. Always take ibuprofen with food and water to avoid GI upset. Stop taking if you develop abdominal pain or blood in stool.     Apply antibiotic ointment once per day x 7 days.     Cleanse wound once per day with gentle soap and water. Do not use peroxide to cleanse as this will disrupt the natural skin healing process. Cover during the day with bandaid to avoid introduction of bacteria. You can leave open to air at night.     Monitor for worsening signs of infection such as redness, warmth, increase in pain, purulent drainage, fevers, chills, body aches.     Return to the clinic or follow up with PCP if not better/worsening after 2-3 days of antibiotic start.

## 2025-02-20 NOTE — PROGRESS NOTES
"Subjective:      Patient ID: Robby Maya is a 54 y.o. male.    Vitals:  height is 5' 8" (1.727 m) and weight is 92.5 kg (204 lb). His oral temperature is 98 °F (36.7 °C). His blood pressure is 143/74 (abnormal) and his pulse is 71. His respiration is 16 and oxygen saturation is 98%.     Chief Complaint: Foot Injury    Pt states left foot pain for 2 weeks, stepped on something in a garden. Left heel. He has been using anti biotic cream     Foot Injury   The incident occurred more than 1 week ago. The incident occurred in the yard.     Constitution: Negative for chills, fatigue and fever.   Musculoskeletal:  Positive for pain.   Skin:  Positive for wound. Negative for erythema.      Objective:     Physical Exam   Constitutional: He is oriented to person, place, and time. He appears well-developed.   HENT:   Head: Normocephalic and atraumatic. Head is without abrasion, without contusion and without laceration.   Ears:   Right Ear: External ear normal.   Left Ear: External ear normal.   Nose: Nose normal.   Mouth/Throat: Oropharynx is clear and moist and mucous membranes are normal.   Eyes: Conjunctivae, EOM and lids are normal. Pupils are equal, round, and reactive to light.   Neck: Trachea normal and phonation normal. Neck supple.   Cardiovascular: Normal rate, regular rhythm and normal heart sounds.   Pulmonary/Chest: Effort normal and breath sounds normal. No stridor. No respiratory distress.   Musculoskeletal: Normal range of motion.         General: Normal range of motion.        Feet:    Neurological: He is alert and oriented to person, place, and time.   Skin: Skin is warm, dry, intact and no rash. Capillary refill takes less than 2 seconds. No abrasion, No burn, No bruising, No erythema and No ecchymosis   Psychiatric: His speech is normal and behavior is normal. Judgment and thought content normal.   Nursing note and vitals reviewed.      Assessment:     1. Splinter in skin        Plan:       Splinter " in skin  -     mupirocin (BACTROBAN) 2 % ointment; Apply topically once daily.  Dispense: 15 g; Refill: 0            Discussed results/diagnosis/plan with patient in clinic. Strict precautions given to patient to monitor for worsening signs and symptoms. Advised to follow up with PCP or specialist.  Explained side effects of medications prescribed with patient and informed him/her to discontinue use if he/she has any side effects and to inform UC or PCP if this occurs. All questions answered. Strict ED verses clinic return precautions stressed and given in depth. Advised if symptoms worsens of fail to improve he/she should go to the Emergency Room. Discharge and follow-up instructions given verbally/printed with the patient who expressed understanding and willingness to comply with my recommendations. Patient voiced understanding and in agreement with current treatment plan. Patient exits the exam room in no acute distress. Conversant and engaged during discharge discussion, verbalized understanding.

## 2025-02-20 NOTE — TELEPHONE ENCOUNTER
----- Message from Aaron sent at 2/20/2025 12:00 PM CST -----  Contact: pt @  [phone]  Name of Who is Calling: pt  What is the request in detail:Pt says he has a splinter stuck in his foot that has not healed pt wants to discuss who he can see about this  Can the clinic reply by [facility]: no  What Number to Call Back if not in [facility]:  [phone]

## 2025-03-16 ENCOUNTER — OFFICE VISIT (OUTPATIENT)
Dept: URGENT CARE | Facility: CLINIC | Age: 55
End: 2025-03-16
Payer: COMMERCIAL

## 2025-03-16 VITALS
WEIGHT: 204 LBS | BODY MASS INDEX: 30.92 KG/M2 | DIASTOLIC BLOOD PRESSURE: 81 MMHG | SYSTOLIC BLOOD PRESSURE: 133 MMHG | HEIGHT: 68 IN | RESPIRATION RATE: 20 BRPM | HEART RATE: 74 BPM | TEMPERATURE: 98 F | OXYGEN SATURATION: 97 %

## 2025-03-16 DIAGNOSIS — R05.9 COUGH, UNSPECIFIED TYPE: Primary | ICD-10-CM

## 2025-03-16 DIAGNOSIS — J39.9 UPPER RESPIRATORY DISEASE: ICD-10-CM

## 2025-03-16 LAB
CTP QC/QA: YES
CTP QC/QA: YES
POC MOLECULAR INFLUENZA A AGN: NEGATIVE
POC MOLECULAR INFLUENZA B AGN: NEGATIVE
SARS CORONAVIRUS 2 ANTIGEN: NEGATIVE

## 2025-03-16 PROCEDURE — 99213 OFFICE O/P EST LOW 20 MIN: CPT | Mod: S$GLB,,, | Performed by: NURSE PRACTITIONER

## 2025-03-16 PROCEDURE — 87811 SARS-COV-2 COVID19 W/OPTIC: CPT | Mod: QW,S$GLB,, | Performed by: NURSE PRACTITIONER

## 2025-03-16 PROCEDURE — 87502 INFLUENZA DNA AMP PROBE: CPT | Mod: QW,S$GLB,, | Performed by: NURSE PRACTITIONER

## 2025-03-16 RX ORDER — OSELTAMIVIR PHOSPHATE 75 MG/1
75 CAPSULE ORAL 2 TIMES DAILY
Qty: 10 CAPSULE | Refills: 0 | Status: SHIPPED | OUTPATIENT
Start: 2025-03-16 | End: 2025-03-21

## 2025-03-16 RX ORDER — AZELASTINE 1 MG/ML
1 SPRAY, METERED NASAL 2 TIMES DAILY
Qty: 30 ML | Refills: 0 | Status: SHIPPED | OUTPATIENT
Start: 2025-03-16 | End: 2026-03-16

## 2025-03-16 RX ORDER — PROMETHAZINE HYDROCHLORIDE AND DEXTROMETHORPHAN HYDROBROMIDE 6.25; 15 MG/5ML; MG/5ML
5 SYRUP ORAL EVERY 4 HOURS PRN
Qty: 50 ML | Refills: 0 | Status: SHIPPED | OUTPATIENT
Start: 2025-03-16 | End: 2025-03-26

## 2025-03-16 NOTE — PROGRESS NOTES
"Subjective:      Patient ID: Robby Maya is a 54 y.o. male.    Vitals:  height is 5' 8" (1.727 m) and weight is 92.5 kg (204 lb). His temperature is 98.4 °F (36.9 °C). His blood pressure is 133/81 and his pulse is 74. His respiration is 20 and oxygen saturation is 97%.     Chief Complaint: Cough    This is a 54 y.o. male   who presents today with a chief complaint of a cough that began a day ago. He's complaining of congestion, sore throat, chills and muscle aches. He hasn't taken any medication to help relieve his symptoms.     Cough  This is a new problem. The current episode started yesterday. The problem has been gradually worsening. The problem occurs every few minutes. The cough is Non-productive. Associated symptoms include myalgias, a sore throat and shortness of breath. Pertinent negatives include no chest pain, chills, ear congestion, ear pain, fever, headaches, heartburn, hemoptysis, nasal congestion, postnasal drip, rash, rhinorrhea, sweats, weight loss or wheezing. Nothing aggravates the symptoms. He has tried nothing for the symptoms.       Constitution: Negative for chills and fever.   HENT:  Positive for congestion and sore throat. Negative for ear pain and postnasal drip.    Cardiovascular:  Negative for chest pain.   Respiratory:  Positive for cough and shortness of breath. Negative for bloody sputum and wheezing.    Gastrointestinal:  Negative for heartburn.   Musculoskeletal:  Positive for muscle ache.   Skin:  Negative for rash.   Neurological:  Negative for headaches.      Objective:     Physical Exam    Assessment:     1. Cough, unspecified type    2. Upper respiratory disease        Plan:       Cough, unspecified type  -     SARS Coronavirus 2 Antigen, POCT Manual Read  -     POCT Influenza A/B MOLECULAR    Upper respiratory disease  -     oseltamivir (TAMIFLU) 75 MG capsule; Take 1 capsule (75 mg total) by mouth 2 (two) times daily. for 5 days  Dispense: 10 capsule; Refill: 0  -     " promethazine-dextromethorphan (PROMETHAZINE-DM) 6.25-15 mg/5 mL Syrp; Take 5 mLs by mouth every 4 (four) hours as needed.  Dispense: 50 mL; Refill: 0  -     azelastine (ASTELIN) 137 mcg (0.1 %) nasal spray; 1 spray (137 mcg total) by Nasal route 2 (two) times daily.  Dispense: 30 mL; Refill: 0      Follow up with your Primary Care Provider in 2-3 days if no improvement.  If you do not have one, please see the list provided and become established with one.  If your condition worsens we recommend that you receive another evaluation at the emergency room immediately or contact your primary medical clinics after hours call service to discuss your concerns.  Flonase nasal spray as directed. Breathe right strips at night to help you breathe.  A cool mist humidifier in bedroom may help with cough and relieve stuffy nose.  Sore throat:  Lozenge, hard candy or honey.You may use Afrin at night to aid in breathing.  Use only for 3 days, any longer and you may develop a dependency.  Sinus rinses DO NOT USE TAP WATER, if you must, water must be a rolling boil for 1 minute, let it cool, then use.  May use distilled water.  Vics vapor rub in shower to help open nasal passages.  May use nasal gel to keep passages moisturized.  May use Nasal saline sprays during the day for added relief of congestion.   For those who go to the gym, please do not use the sauna or steam room now to clear sinuses.Take Mucinex D in the morning and Mucinex DM at night. Mucinex D has Pseudoephedrine in it and can cause palpitations and high blood pressure. If you experience this stop the Mucinex D and only take Mucinex DM (twice a day).  During pollen season, change shirt if you are outside for a while when you go in.  Also wash your face.  Do not touch your face with your hands.  Wash your hands often in general while ill, avoid face contact with hands. Good nutrition. Lots of rest You may or may not have received a steroid injection, if you have, you  may experience some jitters or develop nervousness.  You may also not rest well this night- these symptoms will resolve.  If you were given a prescription for steroids, do not medications such as Motrin, Advil, Ibuprofen, Aleve, Mobic, or Toradol while taking the steroid. these are non-steroidal anti-inflammatory medications which you do not need while taking steroids.  Steroids can elevate your blood pressure, elevate your blood sugar,give you water weight gain, nervous energy, redness to the face and dimpling of the skin where the shot goes in.    If you were given an antibiotic to take at home, please take it until it is completed even if you begin feeling better prior to the course of therapy being completed.  To attempt to minimize abdominal discomfort while taking antibiotics, you may try to take probiotics.  SEPARATE the antibiotics and probiotics by TWO hours, if not neither medication will work.   You must understand that you've received an Urgent Care treatment only and that you may be released before all your medical problems are known or treated. You, the patient, will arrange for follow up care as instructed

## 2025-03-16 NOTE — PATIENT INSTRUCTIONS
Please return here or go to the Emergency Department for any concerns or worsening of condition.  If you were prescribed antibiotics, please take them to completion.  If you were prescribed a narcotic medication, do not drive or operate heavy equipment or machinery while taking these medications.  Please follow up with your primary care doctor or specialist as needed.    If you  smoke, please stop smoking.

## 2025-04-03 ENCOUNTER — OFFICE VISIT (OUTPATIENT)
Dept: URGENT CARE | Facility: CLINIC | Age: 55
End: 2025-04-03
Payer: COMMERCIAL

## 2025-04-03 VITALS
HEART RATE: 96 BPM | OXYGEN SATURATION: 96 % | BODY MASS INDEX: 30.91 KG/M2 | WEIGHT: 203.94 LBS | HEIGHT: 68 IN | DIASTOLIC BLOOD PRESSURE: 77 MMHG | TEMPERATURE: 99 F | SYSTOLIC BLOOD PRESSURE: 126 MMHG | RESPIRATION RATE: 20 BRPM

## 2025-04-03 DIAGNOSIS — B34.9 VIRAL ILLNESS: Primary | ICD-10-CM

## 2025-04-03 DIAGNOSIS — J34.9 SINUS PROBLEM: ICD-10-CM

## 2025-04-03 NOTE — PROGRESS NOTES
"Subjective:      Patient ID: Robby Maya is a 54 y.o. male.    Vitals:  height is 5' 8" (1.727 m) and weight is 92.5 kg (203 lb 14.8 oz). His oral temperature is 98.7 °F (37.1 °C). His blood pressure is 126/77 and his pulse is 96. His respiration is 20 and oxygen saturation is 96%.     Chief Complaint: Sinus Problem    Pt presents today with body aches, congestion, fatigue, dry cough that comes and goes, sx started yesterday, tx:anti pain      Sinus Problem  This is a new problem. The current episode started yesterday. The problem has been gradually worsening since onset. There has been no fever. His pain is at a severity of 8/10. Associated symptoms include chills, congestion, headaches and sinus pressure. Pertinent negatives include no coughing, diaphoresis, ear pain, hoarse voice, neck pain, shortness of breath, sneezing, sore throat or swollen glands. Past treatments include nothing. The treatment provided no relief.       Constitution: Positive for chills. Negative for sweating, fatigue, fever and unexpected weight change.   HENT:  Positive for congestion and sinus pressure. Negative for ear pain, sinus pain, sore throat and trouble swallowing.    Neck: Negative for neck pain.   Respiratory:  Negative for cough and shortness of breath.    Gastrointestinal:  Negative for nausea, vomiting, constipation and diarrhea.   Musculoskeletal:  Positive for muscle ache.   Allergic/Immunologic: Negative for sneezing.   Neurological:  Positive for headaches.      Objective:     Physical Exam   Constitutional: He is oriented to person, place, and time.  Non-toxic appearance. He does not appear ill. No distress. normal  HENT:   Head: Normocephalic and atraumatic.   Ears:   Right Ear: Tympanic membrane, external ear and ear canal normal.   Left Ear: Tympanic membrane, external ear and ear canal normal.   Nose: No rhinorrhea or congestion.   Mouth/Throat: No oropharyngeal exudate or posterior oropharyngeal erythema. "   Eyes: Conjunctivae are normal.   Cardiovascular: Normal rate, regular rhythm and normal heart sounds.   Pulmonary/Chest: Effort normal and breath sounds normal.   Abdominal: Normal appearance.   Musculoskeletal: Normal range of motion.         General: Normal range of motion.   Neurological: He is alert and oriented to person, place, and time.   Skin: Skin is warm and dry.     Results for orders placed or performed in visit on 04/03/25   POCT Influenza A/B MOLECULAR    Collection Time: 04/03/25  8:35 AM   Result Value Ref Range    POC Molecular Influenza A Ag Negative Negative    POC Molecular Influenza B Ag Negative Negative     Acceptable Yes    SARS Coronavirus 2 Antigen, POCT Manual Read    Collection Time: 04/03/25  8:36 AM   Result Value Ref Range    SARS Coronavirus 2 Antigen Negative Negative, Presumptive Negative     Acceptable Yes          Assessment:     1. Viral illness    2. Sinus problem        Plan:       Viral illness    Sinus problem  -     POCT Influenza A/B MOLECULAR  -     SARS Coronavirus 2 Antigen, POCT Manual Read      Viral URI (upper respiratory infection):    Your symptoms are viral in nature.  Viral upper respiratory infections typically run their course in 7-14 days.     - Rest at home.     - Drink plenty of fluids so you won't get dehydrated.    - you can take plain Mucinex (guaifenesin) twice a day (or as directed) to help loosen mucous.     - Cough recommendations:      Dextromethorphan (DM) is a cough suppressant over the counter (ie. mucinex DM, robitussin, delsym; dayquil/nyquil has DM as well.).      Warm tea with honey can help with cough. Honey is a natural cough suppressant.    -Sore throat recommendations: Warm fluids, warm salt water gargles, throat lozenges, tea, honey, soup, or drinking something cold or frozen.  Throat lozenges or sprays help reduce pain. Gargling with warm saltwater (1/4 teaspoon of salt in 1/2 cup of warm water) or an OTC  anesthetic gargle may be useful for irritation.    - Fever/Pain recommendations:      Alternate Tylenol or Ibuprofen as directed for fever/pain.   Take ibuprofen 600-800 mg every 6-8 hours for pain and inflammation. Do not take ibuprofen if you have a history of GI bleeding, kidney disease, or if you take blood thinners.    Tylenol/acetaminophen 650-1000 mg every 6-8 hours for added pain relief.  Avoid tylenol if you have a history of liver disease.     When to seek medical advice  Call your healthcare provider right away if any of these occur:  Fever that is poorly controlled with OTC fever reducing medication  New or worsening ear pain, sinus pain, or headache  Stiff neck  You can't swallow liquids or you can't open your mouth wide because of throat pain  Signs of dehydration. These include very dark urine or no urine, sunken eyes, and dizziness.  Trouble breathing or noisy breathing  Muffled voice  Rash     If your symptoms worsen or fail to improve you should go to Emergency Department.

## 2025-04-03 NOTE — LETTER
April 3, 2025      [facility] [address]  [place] [address]  Phone: [phone]  Fax: [phone]       Patient: Robby Maya   YOB: 1970  Date of Visit: 04/03/2025    To Whom It May Concern:    Wes Maya  was at [facility] on 04/03/2025. The patient may return to work/school on 4/7/2025 with no restrictions. If you have any questions or concerns, or if I can be of further assistance, please do not hesitate to contact me.    Sincerely,    Julia Fisher NP

## 2025-04-04 ENCOUNTER — OFFICE VISIT (OUTPATIENT)
Dept: CARDIOLOGY | Facility: CLINIC | Age: 55
End: 2025-04-04
Payer: COMMERCIAL

## 2025-04-04 VITALS
WEIGHT: 203.5 LBS | DIASTOLIC BLOOD PRESSURE: 55 MMHG | SYSTOLIC BLOOD PRESSURE: 127 MMHG | HEART RATE: 68 BPM | HEIGHT: 68 IN | BODY MASS INDEX: 30.84 KG/M2 | OXYGEN SATURATION: 97 %

## 2025-04-04 DIAGNOSIS — R06.09 DOE (DYSPNEA ON EXERTION): ICD-10-CM

## 2025-04-04 DIAGNOSIS — R07.9 CHEST PAIN, UNSPECIFIED TYPE: ICD-10-CM

## 2025-04-04 DIAGNOSIS — R07.2 PRECORDIAL PAIN: Primary | ICD-10-CM

## 2025-04-04 DIAGNOSIS — R07.9 CHEST PAIN, UNSPECIFIED TYPE: Primary | ICD-10-CM

## 2025-04-04 DIAGNOSIS — R06.83 SNORING: ICD-10-CM

## 2025-04-04 DIAGNOSIS — R00.2 PALPITATIONS: ICD-10-CM

## 2025-04-04 DIAGNOSIS — F41.9 ANXIETY: ICD-10-CM

## 2025-04-04 PROCEDURE — 99999 PR PBB SHADOW E&M-EST. PATIENT-LVL IV: CPT | Mod: PBBFAC,,, | Performed by: STUDENT IN AN ORGANIZED HEALTH CARE EDUCATION/TRAINING PROGRAM

## 2025-04-04 PROCEDURE — 93000 ELECTROCARDIOGRAM COMPLETE: CPT | Mod: S$GLB,,, | Performed by: INTERNAL MEDICINE

## 2025-04-04 PROCEDURE — 1159F MED LIST DOCD IN RCRD: CPT | Mod: CPTII,S$GLB,, | Performed by: STUDENT IN AN ORGANIZED HEALTH CARE EDUCATION/TRAINING PROGRAM

## 2025-04-04 PROCEDURE — 3074F SYST BP LT 130 MM HG: CPT | Mod: CPTII,S$GLB,, | Performed by: STUDENT IN AN ORGANIZED HEALTH CARE EDUCATION/TRAINING PROGRAM

## 2025-04-04 PROCEDURE — 99205 OFFICE O/P NEW HI 60 MIN: CPT | Mod: S$GLB,,, | Performed by: STUDENT IN AN ORGANIZED HEALTH CARE EDUCATION/TRAINING PROGRAM

## 2025-04-04 PROCEDURE — 3008F BODY MASS INDEX DOCD: CPT | Mod: CPTII,S$GLB,, | Performed by: STUDENT IN AN ORGANIZED HEALTH CARE EDUCATION/TRAINING PROGRAM

## 2025-04-04 PROCEDURE — 3078F DIAST BP <80 MM HG: CPT | Mod: CPTII,S$GLB,, | Performed by: STUDENT IN AN ORGANIZED HEALTH CARE EDUCATION/TRAINING PROGRAM

## 2025-04-04 NOTE — PROGRESS NOTES
"  Cardiology Clinic Visit    History of Present Illness:       Robby Maya is a pleasant 54 y.o. male who is a  with PMHx of anxiety, depression here to establish care with multiple CV complaints. Complaints of chest pressure at rest but not on his daily exercise routine,variable intensity, non radiating, associated with SOB/trying to catch his breath. Also, having occasional palpitations, feels like he has a mass of left chest when sleep on his left lateral decubitus. He has been previously workup for ischemia with normal results. .     Fhx +    History obtained by patient interview and supplemented by nursing documentation and chart review.   PMHx:  has a past medical history of Anxiety, Depression, Obsessive compulsive disorder, and Palpitations.   SurgHx:  has a past surgical history that includes Rhinoplasty tip and removal rhinoplasty.   FamHx: family history includes Diabetes in his father, paternal aunt, and paternal uncle; Heart attack in his father; Heart disease in his father; Stroke in his father.   SocialHx:  reports that he has never smoked. He has never used smokeless tobacco. He reports that he does not drink alcohol and does not use drugs.  Home Meds:  Current Outpatient Medications   Medication Instructions    azelastine (ASTELIN) 137 mcg, Nasal, 2 times daily    hydrOXYzine HCL (ATARAX) 25 mg, Oral, Nightly PRN    mupirocin (BACTROBAN) 2 % ointment Topical (Top), Daily    sertraline (ZOLOFT) 100 mg, Oral, Nightly       Review of Systems: Comprehensive ROS was performed and is negative unless otherwise noted in HPI.   Objective   Objective/Exam:   BP (!) 127/55 (BP Location: Left arm, Patient Position: Sitting)   Pulse 68   Ht 5' 8" (1.727 m)   Wt 92.3 kg (203 lb 7.8 oz)   SpO2 97%   BMI 30.94 kg/m²    Wt Readings from Last 4 Encounters:   04/04/25 92.3 kg (203 lb 7.8 oz)   04/03/25 92.5 kg (203 lb 14.8 oz)   03/16/25 92.5 kg (204 lb)   02/20/25 92.5 kg (204 lb) " "     Constitutional: NAD, Atraumatic, Conversant   HEENT: MMM, Sclera anicteric, No JVD   Cardiovascular: RRR, no murmurs noted, no chest tenderness to palpation, 2+ radial pulses b/l  Pulmonary: normal respiratory effort, CTAB, no crackles, wheezes  Abdominal: S/NT/ND  Musculoskeletal: No lower extremity edema noted b/l  Neurological: No gross neurological deficits  Skin: W/D/I  Psych: Appropriate affect, normal mood  Labs/Imaging/Procedures   Personally reviewed  Lab Results   Component Value Date     06/13/2024    K 3.9 06/13/2024     06/13/2024    CO2 26 06/13/2024    BUN 15 06/13/2024    CREATININE 0.9 06/13/2024    ANIONGAP 9 06/13/2024     Lab Results   Component Value Date    HGBA1C 5.5 12/03/2021     No results found for: "BNP", "BNPTRIAGEBLO"   Lab Results   Component Value Date    WBC 8.66 06/16/2015    HGB 15.8 06/16/2015    HCT 45.7 06/16/2015     06/16/2015    GRAN 4.7 06/16/2015    GRAN 54.2 06/16/2015     Lab Results   Component Value Date    CHOL 227 (H) 06/13/2024    HDL 51 06/13/2024    LDLCALC 149.4 06/13/2024    LDLCALC 118.0 07/03/2023    LDLCALC 114.0 06/21/2022    TRIG 133 06/13/2024     Lab Results   Component Value Date    TSH 2.466 06/16/2015     No results found for: "APOLIPOPROTE"  No results found for: "LIPOA"     TTE:  No results found for this or any previous visit.    Stress Testing:   No results found for this or any previous visit.     Coronary Angiogram:  No results found for this or any previous visit.      -Reviewing Medical records & lab results  -Independently reviewing and interpreting (if not documented by myself) EKGs, echocardiograms, catherizations   -Obtaining a history, performing a relevant exam, counseling/educating the patient/family  -Documenting clinical information in the EMR including ordering of tests  -Care coordination and communicating with other health care providers       Problem List:     1. Precordial pain    2. Anxiety    3. " Palpitations    4. SOSA (dyspnea on exertion)    5. Snoring      Assessment/Plan:   Probable cardiac chest pain- likely multifactorial, does not occur with exercise only at rest. LDL  and family hx. Plan Echo and MPI  SOB- plan as above  Palpitations - if negative workup will proceed with event monitor.   Snoring- sleep study      Preventative Care:  Lipids:  PVD(V/A)      Patient expressed verbal understanding and agreed with the plan     Return sooner for concerns or questions. If symptoms persist go to the ED.  I have reviewed all pertinent data including patient's medical history in detail and updated the computerized patient record.     Total time spent counseling greater than fifty percent of total visit time.  Counseling included discussion regarding imaging findings, diagnosis, possibilities, treatment options, risks and benefits.      Thank you for the opportunity to care for this patient. Please don't hesitate to reach out with any questions/concerns         Leonidas Colindres MD  Cardiovascular Disease; Interventional Cardiology  [facility]

## 2025-04-07 ENCOUNTER — TELEPHONE (OUTPATIENT)
Dept: SLEEP MEDICINE | Facility: OTHER | Age: 55
End: 2025-04-07
Payer: COMMERCIAL

## 2025-04-09 LAB
OHS QRS DURATION: 86 MS
OHS QTC CALCULATION: 399 MS

## 2025-04-14 ENCOUNTER — TELEPHONE (OUTPATIENT)
Dept: SLEEP MEDICINE | Facility: OTHER | Age: 55
End: 2025-04-14
Payer: COMMERCIAL

## 2025-04-22 ENCOUNTER — TELEPHONE (OUTPATIENT)
Dept: SLEEP MEDICINE | Facility: OTHER | Age: 55
End: 2025-04-22
Payer: COMMERCIAL

## 2025-04-24 ENCOUNTER — TELEPHONE (OUTPATIENT)
Dept: SLEEP MEDICINE | Facility: CLINIC | Age: 55
End: 2025-04-24
Payer: COMMERCIAL

## 2025-05-16 DIAGNOSIS — R46.81 OBSESSIVE-COMPULSIVE BEHAVIOR: ICD-10-CM

## 2025-05-16 RX ORDER — HYDROXYZINE HYDROCHLORIDE 25 MG/1
25 TABLET, FILM COATED ORAL NIGHTLY PRN
Qty: 60 TABLET | Refills: 3 | Status: SHIPPED | OUTPATIENT
Start: 2025-05-16

## 2025-05-23 NOTE — PROGRESS NOTES
Subjective:       Patient ID: Robby Maya is a 52 y.o. male.    Chief Complaint: Sinus Problem and Cough    HPI  Became ill 18 days ago with sore throat, nasal congestion, fatigue.  Felt better, but a few days ago developed pain around eyes, lower forehead.   No sore throat.  Mild post nasal drip.  Nose mildly congested  No fever.  Coughs small amt of sputum.  He feels weak, and has missed work this week.  He views symptoms as a continuum.    Review of Systems   HENT:  Positive for nasal congestion.    Respiratory:  Positive for cough.        Objective:      Physical Exam  Constitutional:       General: He is not in acute distress.     Appearance: He is well-developed. He is not ill-appearing, toxic-appearing or diaphoretic.   HENT:      Right Ear: Tympanic membrane and ear canal normal.      Left Ear: Tympanic membrane and ear canal normal.      Mouth/Throat:      Pharynx: No oropharyngeal exudate.   Eyes:      General:         Right eye: No discharge.         Left eye: No discharge.   Cardiovascular:      Rate and Rhythm: Normal rate and regular rhythm.   Pulmonary:      Effort: Pulmonary effort is normal. No respiratory distress.      Breath sounds: Normal breath sounds. No wheezing or rales.   Musculoskeletal:      Cervical back: Normal range of motion and neck supple.      Right lower leg: No edema.      Left lower leg: No edema.   Lymphadenopathy:      Cervical: No cervical adenopathy.   Neurological:      Mental Status: He is alert and oriented to person, place, and time.   Psychiatric:         Behavior: Behavior normal.       Assessment:       Problem List Items Addressed This Visit    None  Visit Diagnoses       Bacterial sinusitis    -  Primary    Relevant Medications    azithromycin (Z-DESIREE) 250 MG tablet              Plan:       Rboby was seen today for sinus problem and cough.    Diagnoses and all orders for this visit:    Bacterial sinusitis  -     azithromycin (Z-DESIREE) 250 MG tablet; Take 2  tablets by mouth on day 1; Take 1 tablet by mouth on days 2-5    Other orders  -     fluticasone propionate (FLONASE) 50 mcg/actuation nasal spray; 2 sprays (100 mcg total) by Each Nostril route once daily.         Ibuprofen.    Call if no better      No

## 2025-05-29 ENCOUNTER — LAB VISIT (OUTPATIENT)
Dept: LAB | Facility: HOSPITAL | Age: 55
End: 2025-05-29
Attending: INTERNAL MEDICINE
Payer: COMMERCIAL

## 2025-05-29 ENCOUNTER — OFFICE VISIT (OUTPATIENT)
Dept: INTERNAL MEDICINE | Facility: CLINIC | Age: 55
End: 2025-05-29
Payer: COMMERCIAL

## 2025-05-29 VITALS
HEIGHT: 68 IN | WEIGHT: 197.06 LBS | SYSTOLIC BLOOD PRESSURE: 118 MMHG | DIASTOLIC BLOOD PRESSURE: 70 MMHG | HEART RATE: 68 BPM | BODY MASS INDEX: 29.86 KG/M2

## 2025-05-29 DIAGNOSIS — Z00.00 ROUTINE HISTORY AND PHYSICAL EXAMINATION OF ADULT: ICD-10-CM

## 2025-05-29 DIAGNOSIS — Z12.5 SCREENING FOR PROSTATE CANCER: ICD-10-CM

## 2025-05-29 DIAGNOSIS — Z12.11 SCREEN FOR COLON CANCER: ICD-10-CM

## 2025-05-29 DIAGNOSIS — Z00.00 ROUTINE HISTORY AND PHYSICAL EXAMINATION OF ADULT: Primary | ICD-10-CM

## 2025-05-29 LAB
ALBUMIN SERPL BCP-MCNC: 4.2 G/DL (ref 3.5–5.2)
ALP SERPL-CCNC: 78 UNIT/L (ref 40–150)
ALT SERPL W/O P-5'-P-CCNC: 28 UNIT/L (ref 10–44)
ANION GAP (OHS): 9 MMOL/L (ref 8–16)
AST SERPL-CCNC: 35 UNIT/L (ref 11–45)
BILIRUB SERPL-MCNC: 0.6 MG/DL (ref 0.1–1)
BUN SERPL-MCNC: 18 MG/DL (ref 6–20)
CALCIUM SERPL-MCNC: 9.3 MG/DL (ref 8.7–10.5)
CHLORIDE SERPL-SCNC: 104 MMOL/L (ref 95–110)
CHOLEST SERPL-MCNC: 179 MG/DL (ref 120–199)
CHOLEST/HDLC SERPL: 3.4 {RATIO} (ref 2–5)
CO2 SERPL-SCNC: 25 MMOL/L (ref 23–29)
CREAT SERPL-MCNC: 0.9 MG/DL (ref 0.5–1.4)
EAG (OHS): 114 MG/DL (ref 68–131)
GFR SERPLBLD CREATININE-BSD FMLA CKD-EPI: >60 ML/MIN/1.73/M2
GLUCOSE SERPL-MCNC: 99 MG/DL (ref 70–110)
HBA1C MFR BLD: 5.6 % (ref 4–5.6)
HDLC SERPL-MCNC: 53 MG/DL (ref 40–75)
HDLC SERPL: 29.6 % (ref 20–50)
LDLC SERPL CALC-MCNC: 110.4 MG/DL (ref 63–159)
NONHDLC SERPL-MCNC: 126 MG/DL
POTASSIUM SERPL-SCNC: 4.4 MMOL/L (ref 3.5–5.1)
PROT SERPL-MCNC: 7.7 GM/DL (ref 6–8.4)
PSA SERPL-MCNC: 0.77 NG/ML
SODIUM SERPL-SCNC: 138 MMOL/L (ref 136–145)
TRIGL SERPL-MCNC: 78 MG/DL (ref 30–150)

## 2025-05-29 PROCEDURE — 36415 COLL VENOUS BLD VENIPUNCTURE: CPT

## 2025-05-29 PROCEDURE — 82040 ASSAY OF SERUM ALBUMIN: CPT

## 2025-05-29 PROCEDURE — 84153 ASSAY OF PSA TOTAL: CPT

## 2025-05-29 PROCEDURE — 82465 ASSAY BLD/SERUM CHOLESTEROL: CPT

## 2025-05-29 PROCEDURE — 99999 PR PBB SHADOW E&M-EST. PATIENT-LVL III: CPT | Mod: PBBFAC,,, | Performed by: INTERNAL MEDICINE

## 2025-05-29 PROCEDURE — 83036 HEMOGLOBIN GLYCOSYLATED A1C: CPT

## 2025-05-29 NOTE — PROGRESS NOTES
Subjective:       Patient ID: Robby Maya is a 54 y.o. male.    Chief Complaint:   Annual Exam    HPI - Mr. Maya feels well today.  He has stopped taking hydroxyzine at night, and he feels a little better.  Has noticed some increased itching since stopping that.  He is a nonsmoker, nondrinker.  He is not currently sexually active.  He complains of social isolation.  He works as a PhD and writes grants.    PMH:  Anxiety  Obsessive-Compulsive disorder  Psychogenic Polydipsia, resolved  GERD  Bladder spasms - off treatment     Meds: Reviewed and reconciled in EPIC with patient during visit today.     Review of Systems   Constitutional:  Negative for fatigue.   HENT:  Negative for congestion.    Respiratory:  Negative for shortness of breath.    Cardiovascular:  Negative for chest pain.   Gastrointestinal:  Negative for abdominal pain.   Genitourinary:  Negative for difficulty urinating.   Musculoskeletal:  Negative for arthralgias.   Skin:  Negative for rash.   Neurological:  Negative for headaches.   Psychiatric/Behavioral:  Negative for sleep disturbance.        Objective:      Physical Exam  Constitutional:       General: He is not in acute distress.     Appearance: He is well-developed. He is not diaphoretic.      Comments: Friendly, muscular man in no distress   HENT:      Head: Normocephalic and atraumatic.   Cardiovascular:      Rate and Rhythm: Normal rate and regular rhythm.      Heart sounds: Normal heart sounds. No murmur heard.     No friction rub. No gallop.   Pulmonary:      Effort: No respiratory distress.      Breath sounds: No wheezing or rales.   Chest:      Chest wall: No tenderness.   Skin:     General: Skin is warm.      Findings: No erythema.   Neurological:      Mental Status: He is alert and oriented to person, place, and time.   Psychiatric:         Thought Content: Thought content normal.         Assessment:       1. Routine history and physical examination of adult    2. Screen  for colon cancer    3. Screening for prostate cancer        Plan:       Robby was seen today for annual exam.    Diagnoses and all orders for this visit:    Routine history and physical examination of adult - healthy man.  We brainstormed ways for him to address his social isolation.  Updating labs as recommended for someone his age.  He agreed to the Prevnar vaccination today  -     PSA, Screening; Future  -     Comprehensive metabolic panel; Future  -     Lipid panel; Future  -     Hemoglobin A1c; Future  -     pneumoc 20-gavino conj-dip cr(PF) (PREVNAR-20 (PF)) injection Syrg 0.5 mL    Screen for colon cancer - overdue.  We will do the Cologuard today because he is low risk  -     Cologuard Screening (Multitarget Stool DNA); Future  -     Cologuard Screening (Multitarget Stool DNA)    Screening for prostate cancer - after discussing the pros and cons of PSA screening for prostate cancer, he elected to proceed  -     PSA, Screening; Future    RTC p.r.n., or in 1 year    JIHAN Velarde MD MPH  Staff Internist